# Patient Record
Sex: MALE | Race: WHITE | Employment: PART TIME | URBAN - METROPOLITAN AREA
[De-identification: names, ages, dates, MRNs, and addresses within clinical notes are randomized per-mention and may not be internally consistent; named-entity substitution may affect disease eponyms.]

---

## 2020-02-13 ENCOUNTER — APPOINTMENT (OUTPATIENT)
Dept: CT IMAGING | Age: 45
End: 2020-02-13
Payer: COMMERCIAL

## 2020-02-13 ENCOUNTER — APPOINTMENT (OUTPATIENT)
Dept: MRI IMAGING | Age: 45
End: 2020-02-13
Payer: COMMERCIAL

## 2020-02-13 ENCOUNTER — HOSPITAL ENCOUNTER (OUTPATIENT)
Age: 45
Setting detail: OBSERVATION
Discharge: HOME | End: 2020-02-14
Attending: EMERGENCY MEDICINE | Admitting: INTERNAL MEDICINE
Payer: COMMERCIAL

## 2020-02-13 PROBLEM — G45.9 TIA (TRANSIENT ISCHEMIC ATTACK): Status: ACTIVE | Noted: 2020-02-13

## 2020-02-13 LAB
A/G RATIO: 1.5 (ref 1.1–2.2)
ALBUMIN SERPL-MCNC: 4.6 G/DL (ref 3.4–5)
ALP BLD-CCNC: 103 U/L (ref 40–129)
ALT SERPL-CCNC: 38 U/L (ref 10–40)
ANION GAP SERPL CALCULATED.3IONS-SCNC: 10 MMOL/L (ref 3–16)
AST SERPL-CCNC: 28 U/L (ref 15–37)
BASOPHILS ABSOLUTE: 0.1 K/UL (ref 0–0.2)
BASOPHILS RELATIVE PERCENT: 0.9 %
BILIRUB SERPL-MCNC: 0.6 MG/DL (ref 0–1)
BILIRUBIN URINE: NEGATIVE
BLOOD, URINE: NEGATIVE
BUN BLDV-MCNC: 12 MG/DL (ref 7–20)
CALCIUM SERPL-MCNC: 9.2 MG/DL (ref 8.3–10.6)
CHLORIDE BLD-SCNC: 100 MMOL/L (ref 99–110)
CHOLESTEROL, TOTAL: 146 MG/DL (ref 0–199)
CLARITY: CLEAR
CO2: 27 MMOL/L (ref 21–32)
COLOR: YELLOW
CREAT SERPL-MCNC: 0.9 MG/DL (ref 0.9–1.3)
EKG ATRIAL RATE: 48 BPM
EKG DIAGNOSIS: NORMAL
EKG P AXIS: 40 DEGREES
EKG P-R INTERVAL: 152 MS
EKG Q-T INTERVAL: 446 MS
EKG QRS DURATION: 84 MS
EKG QTC CALCULATION (BAZETT): 398 MS
EKG R AXIS: 31 DEGREES
EKG T AXIS: 21 DEGREES
EKG VENTRICULAR RATE: 48 BPM
EOSINOPHILS ABSOLUTE: 0.1 K/UL (ref 0–0.6)
EOSINOPHILS RELATIVE PERCENT: 1.4 %
GFR AFRICAN AMERICAN: >60
GFR NON-AFRICAN AMERICAN: >60
GLOBULIN: 3 G/DL
GLUCOSE BLD-MCNC: 116 MG/DL (ref 70–99)
GLUCOSE URINE: NEGATIVE MG/DL
HCT VFR BLD CALC: 46.9 % (ref 40.5–52.5)
HDLC SERPL-MCNC: 42 MG/DL (ref 40–60)
HEMOGLOBIN: 16.3 G/DL (ref 13.5–17.5)
KETONES, URINE: NEGATIVE MG/DL
LDL CHOLESTEROL CALCULATED: 88 MG/DL
LEUKOCYTE ESTERASE, URINE: NEGATIVE
LYMPHOCYTES ABSOLUTE: 1.5 K/UL (ref 1–5.1)
LYMPHOCYTES RELATIVE PERCENT: 21.7 %
MCH RBC QN AUTO: 31.6 PG (ref 26–34)
MCHC RBC AUTO-ENTMCNC: 34.7 G/DL (ref 31–36)
MCV RBC AUTO: 91.1 FL (ref 80–100)
MICROSCOPIC EXAMINATION: NORMAL
MONOCYTES ABSOLUTE: 0.5 K/UL (ref 0–1.3)
MONOCYTES RELATIVE PERCENT: 7.8 %
NEUTROPHILS ABSOLUTE: 4.7 K/UL (ref 1.7–7.7)
NEUTROPHILS RELATIVE PERCENT: 68.2 %
NITRITE, URINE: NEGATIVE
PDW BLD-RTO: 13 % (ref 12.4–15.4)
PH UA: 7.5 (ref 5–8)
PLATELET # BLD: 250 K/UL (ref 135–450)
PMV BLD AUTO: 9.1 FL (ref 5–10.5)
POTASSIUM SERPL-SCNC: 4.6 MMOL/L (ref 3.5–5.1)
PROTEIN UA: NEGATIVE MG/DL
RBC # BLD: 5.15 M/UL (ref 4.2–5.9)
SODIUM BLD-SCNC: 137 MMOL/L (ref 136–145)
SPECIFIC GRAVITY UA: 1.01 (ref 1–1.03)
TOTAL PROTEIN: 7.6 G/DL (ref 6.4–8.2)
TRIGL SERPL-MCNC: 80 MG/DL (ref 0–150)
TROPONIN: <0.01 NG/ML
URINE TYPE: NORMAL
UROBILINOGEN, URINE: 0.2 E.U./DL
VLDLC SERPL CALC-MCNC: 16 MG/DL
WBC # BLD: 6.9 K/UL (ref 4–11)

## 2020-02-13 PROCEDURE — 80053 COMPREHEN METABOLIC PANEL: CPT

## 2020-02-13 PROCEDURE — G0378 HOSPITAL OBSERVATION PER HR: HCPCS

## 2020-02-13 PROCEDURE — 93005 ELECTROCARDIOGRAM TRACING: CPT | Performed by: EMERGENCY MEDICINE

## 2020-02-13 PROCEDURE — 6370000000 HC RX 637 (ALT 250 FOR IP): Performed by: INTERNAL MEDICINE

## 2020-02-13 PROCEDURE — 6360000004 HC RX CONTRAST MEDICATION: Performed by: EMERGENCY MEDICINE

## 2020-02-13 PROCEDURE — 97165 OT EVAL LOW COMPLEX 30 MIN: CPT

## 2020-02-13 PROCEDURE — 2580000003 HC RX 258: Performed by: INTERNAL MEDICINE

## 2020-02-13 PROCEDURE — 36415 COLL VENOUS BLD VENIPUNCTURE: CPT

## 2020-02-13 PROCEDURE — 81003 URINALYSIS AUTO W/O SCOPE: CPT

## 2020-02-13 PROCEDURE — 84484 ASSAY OF TROPONIN QUANT: CPT

## 2020-02-13 PROCEDURE — 99220 PR INITIAL OBSERVATION CARE/DAY 70 MINUTES: CPT | Performed by: PSYCHIATRY & NEUROLOGY

## 2020-02-13 PROCEDURE — 80061 LIPID PANEL: CPT

## 2020-02-13 PROCEDURE — 99285 EMERGENCY DEPT VISIT HI MDM: CPT

## 2020-02-13 PROCEDURE — 70450 CT HEAD/BRAIN W/O DYE: CPT

## 2020-02-13 PROCEDURE — 70496 CT ANGIOGRAPHY HEAD: CPT

## 2020-02-13 PROCEDURE — 85025 COMPLETE CBC W/AUTO DIFF WBC: CPT

## 2020-02-13 RX ORDER — IBUPROFEN 400 MG/1
400 TABLET ORAL ONCE
Status: COMPLETED | OUTPATIENT
Start: 2020-02-13 | End: 2020-02-13

## 2020-02-13 RX ORDER — ONDANSETRON 2 MG/ML
4 INJECTION INTRAMUSCULAR; INTRAVENOUS EVERY 6 HOURS PRN
Status: DISCONTINUED | OUTPATIENT
Start: 2020-02-13 | End: 2020-02-14 | Stop reason: HOSPADM

## 2020-02-13 RX ORDER — ASPIRIN 300 MG/1
300 SUPPOSITORY RECTAL DAILY
Status: DISCONTINUED | OUTPATIENT
Start: 2020-02-13 | End: 2020-02-14 | Stop reason: HOSPADM

## 2020-02-13 RX ORDER — SODIUM CHLORIDE 0.9 % (FLUSH) 0.9 %
10 SYRINGE (ML) INJECTION EVERY 12 HOURS SCHEDULED
Status: DISCONTINUED | OUTPATIENT
Start: 2020-02-13 | End: 2020-02-14 | Stop reason: HOSPADM

## 2020-02-13 RX ORDER — ATORVASTATIN CALCIUM 80 MG/1
80 TABLET, FILM COATED ORAL NIGHTLY
Status: DISCONTINUED | OUTPATIENT
Start: 2020-02-13 | End: 2020-02-14 | Stop reason: HOSPADM

## 2020-02-13 RX ORDER — LABETALOL HYDROCHLORIDE 5 MG/ML
10 INJECTION, SOLUTION INTRAVENOUS EVERY 10 MIN PRN
Status: DISCONTINUED | OUTPATIENT
Start: 2020-02-13 | End: 2020-02-14 | Stop reason: HOSPADM

## 2020-02-13 RX ORDER — SODIUM CHLORIDE 0.9 % (FLUSH) 0.9 %
10 SYRINGE (ML) INJECTION PRN
Status: DISCONTINUED | OUTPATIENT
Start: 2020-02-13 | End: 2020-02-14 | Stop reason: HOSPADM

## 2020-02-13 RX ORDER — POLYETHYLENE GLYCOL 3350 17 G/17G
17 POWDER, FOR SOLUTION ORAL DAILY PRN
Status: DISCONTINUED | OUTPATIENT
Start: 2020-02-13 | End: 2020-02-14 | Stop reason: HOSPADM

## 2020-02-13 RX ORDER — ASPIRIN 81 MG/1
81 TABLET ORAL DAILY
Status: DISCONTINUED | OUTPATIENT
Start: 2020-02-13 | End: 2020-02-14 | Stop reason: HOSPADM

## 2020-02-13 RX ADMIN — Medication 10 ML: at 20:33

## 2020-02-13 RX ADMIN — ASPIRIN 81 MG: 81 TABLET, COATED ORAL at 15:40

## 2020-02-13 RX ADMIN — IBUPROFEN 400 MG: 400 TABLET, FILM COATED ORAL at 18:53

## 2020-02-13 RX ADMIN — IOPAMIDOL 75 ML: 755 INJECTION, SOLUTION INTRAVENOUS at 09:39

## 2020-02-13 ASSESSMENT — ENCOUNTER SYMPTOMS
SINUS PRESSURE: 0
EYE PAIN: 0
ANAL BLEEDING: 0
BACK PAIN: 0
STRIDOR: 0
ABDOMINAL PAIN: 0
VOICE CHANGE: 0
DIARRHEA: 0
PHOTOPHOBIA: 0
CHEST TIGHTNESS: 0
BLOOD IN STOOL: 0
EYE REDNESS: 0
FACIAL SWELLING: 0
WHEEZING: 0
RHINORRHEA: 0
VOMITING: 0
COUGH: 0
NAUSEA: 0
SORE THROAT: 0
SHORTNESS OF BREATH: 0
EYE DISCHARGE: 0
CONSTIPATION: 0
EYE ITCHING: 0
ABDOMINAL DISTENTION: 0
TROUBLE SWALLOWING: 0

## 2020-02-13 ASSESSMENT — PAIN SCALES - GENERAL
PAINLEVEL_OUTOF10: 9
PAINLEVEL_OUTOF10: 0
PAINLEVEL_OUTOF10: 0

## 2020-02-13 NOTE — PROGRESS NOTES
only   Patient Goals   Patient goals :  To return home        Therapy Time   Individual Concurrent Group Co-treatment   Time In 1355         Time Out 1410         Minutes 15         Timed Code Treatment Minutes: 0 Minutes(15 minutes for eval )       Gill Garcia, OT

## 2020-02-13 NOTE — CONSULTS
enoxaparin (LOVENOX) injection 40 mg  40 mg Subcutaneous Daily Janet Braden MD        aspirin EC tablet 81 mg  81 mg Oral Daily Janet Braden MD        Or    aspirin suppository 300 mg  300 mg Rectal Daily Janet Braden MD        perflutren lipid microspheres (DEFINITY) injection 1.65 mg  1.5 mL Intravenous ONCE PRN Janet Braden MD        polyethylene glycol (GLYCOLAX) packet 17 g  17 g Oral Daily PRN Janet Braden MD        atorvastatin (LIPITOR) tablet 80 mg  80 mg Oral Nightly Janet Braden MD        labetalol (NORMODYNE;TRANDATE) injection 10 mg  10 mg Intravenous Q10 Min PRN Janet Braden MD           ROS : A 10-14 system review of constitutional, cardiovascular, respiratory, eyes, musculoskeletal, endocrine, GI, ENT, skin, hematological, genitourinary, psychiatric and neurologic systems was obtained and updated today and is unremarkable except as mentioned in my HPI      Exam:     Constitutional:   Vitals:    02/13/20 1019 02/13/20 1159 02/13/20 1214 02/13/20 1215   BP: 121/72 122/83 (!) 145/93    Pulse: 60 55 54    Resp: 14 16 18    Temp:   97.9 °F (36.6 °C)    TempSrc:   Oral    SpO2: 96% 100% 99%    Weight:    258 lb 8 oz (117.3 kg)   Height:    6' (1.829 m)       General appearance:  Normal development and appear in no acute distress. Eye: No icterus. Fundus: No blurring of optic disc. Neck: supple  Cardiovascular: No carotid bruit. No lower leg edema with good pulsation. Mental Status:   Oriented to person, place, problem, and time. Memory: Aware of recent and remote event. Good immediate recall. Intact remote memory  Normal attention span and concentration. Language: intact naming, repeating and fluency   Good fund of Knowledge. Aware of current events and vocabulary   Cranial Nerves:   II: Visual fields: Full to confrontation and nl [facility]. Pupils: equal, round, reactive to light  III,IV,VI: Extra Ocular Movements are intact.  No nystagmus  V: Facial sensation is intact to pin prick and light touch  VII: Facial strength and movements: intact and symmetric  VIII: Hearing: Intact to finger rub bilaterally  IX: Palate elevation is symmetric  XI: Shoulder shrug is intact  XII: Tongue movements are normal  Musculoskeletal: 5/5 in all 4 extremities. Tone: Normal tone. Reflexes: Bilateral biceps 2/4, triceps 2/4, brachial radialis 2/4, knee 2/4 and ankle 2/4. Planters: flexor bilaterally. Coordination: no pronator drift, no dysmetria with FNF in upper extremities. Normal REM. Sensation: normal to all modalities in both arms and legs. Gait/Posture: steady gait and normal posturing and station. Data:  LABS:   Lab Results   Component Value Date     02/13/2020    K 4.6 02/13/2020     02/13/2020    CO2 27 02/13/2020    BUN 12 02/13/2020    CREATININE 0.9 02/13/2020    GFRAA >60 02/13/2020    LABGLOM >60 02/13/2020    GLUCOSE 116 02/13/2020    CALCIUM 9.2 02/13/2020     Lab Results   Component Value Date    WBC 6.9 02/13/2020    RBC 5.15 02/13/2020    HGB 16.3 02/13/2020    HCT 46.9 02/13/2020    MCV 91.1 02/13/2020    RDW 13.0 02/13/2020     02/13/2020   No results found for: INR, PROTIME    Neuroimaging were independently reviewed by myself and discussed results with the patient  Reviewed notes from different physicians  Reviewed lab and blood testing    Impression:  Acute left-sided paresthesia. Possible TIA versus CVA. So far cryptogenic. Smoking      Recommendation:  MRI of the brain  Echo  Telemetry  DVT and GI prophylaxis  A1c  Lipid panel  Aspirin  Statin  Nicotine patch  Blood sugar and blood pressure monitor  PT and OT  Long discussion with the patient today regarding stroke prevention and risk of recurrence  Further recommendation to follow after MRI and echo. Thank you for referring such patient. If you have any questions regarding my consult note, please don't hesitate to call me.      Lieutenant Leni MD  [phone]    This

## 2020-02-13 NOTE — H&P
Hospital Medicine History & Physical      PCP: No primary care provider on file. Date of Admission: 2/13/2020    Date of Service: Pt seen/examined on 02/13/20 and placed in observation. Chief Complaint: Left-sided numbness      History Of Present Illness:      38 y.o. male who presented to [facility] with left-sided numbness that started about 24 hours ago and persisted off and on. Was at work when the symptoms first started. Profession is . Was working, but not performing any strenuous activity. Complaints initially were relieved by rubbing, later became persistent with some weakness of his left arm. No chest pain, no shortness of breath. Because of persistence of the complaints, patient came to the emergency room today. Stroke team was called. NIH stroke scale was 0.  tPA was not indicated. No other accompanying symptoms except for left temporal headache which stayed about 15 to 20 minutes and resolved after the first episode  Nothing else that makes the patient feel better or worse  Never had anything like this before prior to this 24-hour period. Comfortable at the time of this evaluation. States he feels the left side still weak especially on upper extremity. Past Medical History:      History reviewed. No pertinent past medical history. Past Surgical History:      History reviewed. No pertinent surgical history. Medications Prior to Admission:      Not on any medications at home    Allergies:  Patient has no known allergies. Social History:      The patient currently lives at home    TOBACCO:   reports that he has never smoked. He has never used smokeless tobacco.  ETOH:   reports previous alcohol use. E-Cigarettes Vaping or Juuling     Questions Responses    E-Cigarette Use     Start Date     Does device contain nicotine? Quit Date     E-Cigarette Type             Family History:      Reviewed in detail and negative for DM, CAD, Cancer, CVA. Positive as follows:    History reviewed. No pertinent family history. REVIEW OF SYSTEMS:   Pertinent positives as noted in the HPI. Left-sided numbness. All other systems reviewed and negative. PHYSICAL EXAM PERFORMED:    /72   Pulse 60   Temp 98 °F (36.7 °C)   Resp 14   Ht 6' (1.829 m)   Wt 252 lb (114.3 kg)   SpO2 96%   BMI 34.18 kg/m²     General appearance:  No apparent distress, appears stated age and cooperative. HEENT:  Normal cephalic, atraumatic without obvious deformity. Pupils equal, round, and reactive to light. Extra ocular muscles intact. Conjunctivae/corneas clear. Neck: Supple, with full range of motion. No jugular venous distention. Trachea midline. Respiratory:  Normal respiratory effort. Clear to auscultation, bilaterally without Rales/Wheezes/Rhonchi. Cardiovascular:  Regular rate and rhythm with normal S1/S2 without murmurs, rubs or gallops. Abdomen: Soft, non-tender, non-distended with normal bowel sounds. Musculoskeletal:  No clubbing, cyanosis or edema bilaterally. Full range of motion without deformity. Skin: Skin color, texture, turgor normal.  No rashes or lesions. Neurologic:  Neurovascularly intact without any focal sensory/motor deficits. Cranial nerves: II-XII intact, grossly non-focal.  Patient feels subjectively weak on the left upper extremity. I do not detect any difference in motor function between left and right. Psychiatric:  Alert and oriented, thought content appropriate, normal insight  Capillary Refill: Brisk,< 3 seconds   Peripheral Pulses: +2 palpable, equal bilaterally       Labs:     Recent Labs     02/13/20  0846   WBC 6.9   HGB 16.3   HCT 46.9        Recent Labs     02/13/20  0846      K 4.6      CO2 27   BUN 12   CREATININE 0.9   CALCIUM 9.2     Recent Labs     02/13/20  0846   AST 28   ALT 38   BILITOT 0.6   ALKPHOS 103     No results for input(s): INR in the last 72 hours.   Recent Labs     02/13/20  7237 TROPONINI <0.01       Urinalysis:    No results found for: Katherine Kuo, BACTERIA, RBCUA, BLOODU, [address]    Radiology:     CXR: I have reviewed the CXR with the following interpretation: Not done today  EKG:  I have reviewed the EKG with the following interpretation: Sinus bradycardia    CTA HEAD NECK W CONTRAST   Preliminary Result   Unremarkable CTA of the head and neck. CT HEAD WO CONTRAST   Preliminary Result   No acute intracranial abnormality. ASSESSMENT:    Active Hospital Problems    Diagnosis Date Noted    TIA (transient ischemic attack) [G45.9] 02/13/2020         PLAN:    Left hemiparesis  CT head, CT angiogram of head and neck are all unremarkable  No TPA indicated  Continue management per protocol, with aspirin and statin, while we wait for MRI of the brain and echocardiogram to evaluate the possibility of a TIA or a stroke. Neurology consulted    Sinus bradycardia  Transient. Might be related to patient's symptoms. Keep on telemetry. If bradycardia recurs, we will consult cardiology. Left-sided headache  This was also transient. If negative for CVA, could be atypical migraine. Neurology consulted. Discussed with the patient and wife, questions answered    DVT Prophylaxis: Lovenox  Diet: DIET GENERAL;  Code Status: Full Code    PT/OT Eval Status: Requested    Dispo -observation stay pending work-up       Montse Devine MD    Thank you  for the opportunity to be involved in this patient's care. If you have any questions or concerns please feel free to contact me at [phone].

## 2020-02-13 NOTE — PROGRESS NOTES
Patient arrived to the floor in calm and stable condition. Patient AOx4 VSS and assessment completed. No skin issues. SB on tele monitor (baseline). SpO2 wnl on RA. Dietary called for food order. Patient oriented to the room and plan of care. Call light and bedside table within reach, bed alarm off as patient is independent, bed in lowest position and locked with 2/4 rails raised. RN will continue to monitor.

## 2020-02-13 NOTE — ED PROVIDER NOTES
which \"comes and goes\"). Negative for back pain, joint swelling, neck pain and neck stiffness. Skin: Negative for rash and wound. Neurological: Positive for weakness (LUE), numbness (left side of his face, not including his forehead) and headaches (frontal and bitemporal). Negative for dizziness, syncope, facial asymmetry and speech difficulty. Hematological: Does not bruise/bleed easily. Psychiatric/Behavioral: Negative for agitation, confusion, hallucinations, self-injury, sleep disturbance and suicidal ideas. The patient is not nervous/anxious. All other systems reviewed and are negative. Physical Exam  Vitals signs and nursing note reviewed. Constitutional:       General: He is not in acute distress. Appearance: He is well-developed. HENT:      Head: Normocephalic and atraumatic. Right Ear: External ear normal.      Left Ear: External ear normal.      Nose: Nose normal.      Mouth/Throat:      Pharynx: No oropharyngeal exudate. Eyes:      General: No scleral icterus. Right eye: No discharge. Left eye: No discharge. Conjunctiva/sclera: Conjunctivae normal.      Pupils: Pupils are equal, round, and reactive to light. Neck:      Musculoskeletal: Normal range of motion and neck supple. Vascular: No JVD. Trachea: No tracheal deviation. Cardiovascular:      Rate and Rhythm: Normal rate and regular rhythm. Heart sounds: Normal heart sounds. No murmur. No friction rub. No gallop. Pulmonary:      Effort: Pulmonary effort is normal. No respiratory distress. Breath sounds: Normal breath sounds. No wheezing or rales. Abdominal:      General: Bowel sounds are normal. There is no distension. Palpations: Abdomen is soft. There is no mass. Tenderness: There is no abdominal tenderness. There is no guarding or rebound. Musculoskeletal: Normal range of motion. General: No tenderness.    Lymphadenopathy:      Cervical: No cervical adenopathy. Skin:     General: Skin is warm and dry. Coloration: Skin is not pale. Findings: No erythema or rash. Neurological:      Mental Status: He is alert and oriented to person, place, and time. GCS: GCS eye subscore is 4. GCS verbal subscore is 5. GCS motor subscore is 6. Cranial Nerves: Cranial nerves are intact. No cranial nerve deficit. Sensory: Sensation is intact. Motor: Motor function is intact. No abnormal muscle tone. Coordination: Coordination is intact. Coordination normal.      Gait: Gait is intact. Deep Tendon Reflexes: Reflexes are normal and symmetric. Reflexes normal.      Reflex Scores:       Bicep reflexes are 2+ on the right side and 2+ on the left side. Patellar reflexes are 2+ on the right side and 2+ on the left side. Comments: Coordination, gait, speech, balance and cognition are intact. There is no nuchal rigidity or evidence of meningismus. Negative Kernig's and Brudzinski's signs. All sensory and motor components of the brachial/lumbosacral plexus tested are symmetric and intact. No focal deficits appreciated. NIH stroke score 0   Psychiatric:         Behavior: Behavior normal.         Thought Content:  Thought content normal.         Judgment: Judgment normal.          Procedures     MDM   Results for orders placed or performed during the hospital encounter of 02/13/20   CBC Auto Differential   Result Value Ref Range    WBC 6.9 4.0 - 11.0 K/uL    RBC 5.15 4.20 - 5.90 M/uL    Hemoglobin 16.3 13.5 - 17.5 g/dL    Hematocrit 46.9 40.5 - 52.5 %    MCV 91.1 80.0 - 100.0 fL    MCH 31.6 26.0 - 34.0 pg    MCHC 34.7 31.0 - 36.0 g/dL    RDW 13.0 12.4 - 15.4 %    Platelets 508 487 - 167 K/uL    MPV 9.1 5.0 - 10.5 fL    Neutrophils % 68.2 %    Lymphocytes % 21.7 %    Monocytes % 7.8 %    Eosinophils % 1.4 %    Basophils % 0.9 %    Neutrophils Absolute 4.7 1.7 - 7.7 K/uL    Lymphocytes Absolute 1.5 1.0 - 5.1 K/uL    Monocytes Absolute 0.5 0.0 - 1.3 K/uL    Eosinophils Absolute 0.1 0.0 - 0.6 K/uL    Basophils Absolute 0.1 0.0 - 0.2 K/uL   Comprehensive Metabolic Panel   Result Value Ref Range    Sodium 137 136 - 145 mmol/L    Potassium 4.6 3.5 - 5.1 mmol/L    Chloride 100 99 - 110 mmol/L    CO2 27 21 - 32 mmol/L    Anion Gap 10 3 - 16    Glucose 116 (H) 70 - 99 mg/dL    BUN 12 7 - 20 mg/dL    CREATININE 0.9 0.9 - 1.3 mg/dL    GFR Non-African American >60 >60    GFR African American >60 >60    Calcium 9.2 8.3 - 10.6 mg/dL    Total Protein 7.6 6.4 - 8.2 g/dL    Alb 4.6 3.4 - 5.0 g/dL    Albumin/Globulin Ratio 1.5 1.1 - 2.2    Total Bilirubin 0.6 0.0 - 1.0 mg/dL    Alkaline Phosphatase 103 40 - 129 U/L    ALT 38 10 - 40 U/L    AST 28 15 - 37 U/L    Globulin 3.0 g/dL   Troponin   Result Value Ref Range    Troponin <0.01 <0.01 ng/mL   EKG 12 Lead   Result Value Ref Range    Ventricular Rate 48 BPM    Atrial Rate 48 BPM    P-R Interval 152 ms    QRS Duration 84 ms    Q-T Interval 446 ms    QTc Calculation (Bazett) 398 ms    P Axis 40 degrees    R Axis 31 degrees    T Axis 21 degrees    Diagnosis       Marked sinus bradycardiaAbnormal ECGNo previous ECGs available       I spoke with Dr. Eve Gonzales, hospitalist. We thoroughly discussed the history, physical exam, laboratory and imaging studies, as well as, emergency department course. Based upon that discussion, we've decided to admit Leora Lacy for further observation and evaluation of Dana Landa's CVA-like symptoms. As I have deemed necessary from their history, physical and studies, I have considered and evaluated Leora Lacy for the following diagnoses:  DIABETES, INTRACRANIAL HEMORRHAGE, MENINGITIS, SUBARACHNOID HEMORRHAGE, SUBDURAL HEMATOMA, & STROKE. FINAL IMPRESSION  1. TIA (transient ischemic attack)    2. Paresthesia        Vitals:  Blood pressure 121/72, pulse 60, temperature 98 °F (36.7 °C), resp. rate 14, height 6' (1.829 m), weight 252 lb (114.3 kg), SpO2 96 %.   Radiology  Ct Head Wo

## 2020-02-13 NOTE — PROGRESS NOTES
Speech Language Pathology  SLP Eval and Treat    SLP acknowledged order for SLP Eval and Treat, reviewed pt's chart, spoke with RN. Pt passed swallow screen with RN per chart. Pt denied dysphagia and no hx of dysphagia per chart review. Oral-motor exam grossly unremarkable. Pt denied speech/lang/cog deficits, able to participate in conversation with SLP without difficulty. Formal evaluation not indicated at this time, please re-refer ST if changes occur. No charges.     Thank you,  DEMETRIS ArshadS. [id number] [facility]  Speech-language pathologist  [phone]

## 2020-02-14 VITALS
TEMPERATURE: 98.2 F | RESPIRATION RATE: 18 BRPM | WEIGHT: 258.5 LBS | HEART RATE: 60 BPM | DIASTOLIC BLOOD PRESSURE: 82 MMHG | SYSTOLIC BLOOD PRESSURE: 125 MMHG | OXYGEN SATURATION: 99 % | HEIGHT: 72 IN | BODY MASS INDEX: 35.01 KG/M2

## 2020-02-14 LAB
A/G RATIO: 1.5 (ref 1.1–2.2)
ALBUMIN SERPL-MCNC: 4 G/DL (ref 3.4–5)
ALP BLD-CCNC: 90 U/L (ref 40–129)
ALT SERPL-CCNC: 34 U/L (ref 10–40)
ANION GAP SERPL CALCULATED.3IONS-SCNC: 6 MMOL/L (ref 3–16)
AST SERPL-CCNC: 23 U/L (ref 15–37)
BILIRUB SERPL-MCNC: 0.5 MG/DL (ref 0–1)
BUN BLDV-MCNC: 13 MG/DL (ref 7–20)
CALCIUM SERPL-MCNC: 8.9 MG/DL (ref 8.3–10.6)
CHLORIDE BLD-SCNC: 102 MMOL/L (ref 99–110)
CO2: 27 MMOL/L (ref 21–32)
CREAT SERPL-MCNC: 0.9 MG/DL (ref 0.9–1.3)
ESTIMATED AVERAGE GLUCOSE: 93.9 MG/DL
GFR AFRICAN AMERICAN: >60
GFR NON-AFRICAN AMERICAN: >60
GLOBULIN: 2.7 G/DL
GLUCOSE BLD-MCNC: 96 MG/DL (ref 70–99)
HBA1C MFR BLD: 4.9 %
HCT VFR BLD CALC: 43.4 % (ref 40.5–52.5)
HEMOGLOBIN: 15 G/DL (ref 13.5–17.5)
LV EF: 58 %
LVEF MODALITY: NORMAL
MCH RBC QN AUTO: 31.5 PG (ref 26–34)
MCHC RBC AUTO-ENTMCNC: 34.5 G/DL (ref 31–36)
MCV RBC AUTO: 91.2 FL (ref 80–100)
PDW BLD-RTO: 13 % (ref 12.4–15.4)
PLATELET # BLD: 224 K/UL (ref 135–450)
PMV BLD AUTO: 9.2 FL (ref 5–10.5)
POTASSIUM REFLEX MAGNESIUM: 4.5 MMOL/L (ref 3.5–5.1)
RBC # BLD: 4.76 M/UL (ref 4.2–5.9)
SODIUM BLD-SCNC: 135 MMOL/L (ref 136–145)
TOTAL PROTEIN: 6.7 G/DL (ref 6.4–8.2)
WBC # BLD: 6.1 K/UL (ref 4–11)

## 2020-02-14 PROCEDURE — 2580000003 HC RX 258: Performed by: INTERNAL MEDICINE

## 2020-02-14 PROCEDURE — 6370000000 HC RX 637 (ALT 250 FOR IP): Performed by: INTERNAL MEDICINE

## 2020-02-14 PROCEDURE — 36415 COLL VENOUS BLD VENIPUNCTURE: CPT

## 2020-02-14 PROCEDURE — 80053 COMPREHEN METABOLIC PANEL: CPT

## 2020-02-14 PROCEDURE — 99225 PR SBSQ OBSERVATION CARE/DAY 25 MINUTES: CPT | Performed by: PSYCHIATRY & NEUROLOGY

## 2020-02-14 PROCEDURE — 85027 COMPLETE CBC AUTOMATED: CPT

## 2020-02-14 PROCEDURE — 83036 HEMOGLOBIN GLYCOSYLATED A1C: CPT

## 2020-02-14 PROCEDURE — G0378 HOSPITAL OBSERVATION PER HR: HCPCS

## 2020-02-14 PROCEDURE — 93306 TTE W/DOPPLER COMPLETE: CPT

## 2020-02-14 RX ORDER — ASPIRIN 81 MG/1
81 TABLET ORAL DAILY
Qty: 30 TABLET | Refills: 3 | Status: ON HOLD | OUTPATIENT
Start: 2020-02-15 | End: 2022-10-10 | Stop reason: HOSPADM

## 2020-02-14 RX ORDER — ATORVASTATIN CALCIUM 40 MG/1
40 TABLET, FILM COATED ORAL NIGHTLY
Qty: 30 TABLET | Refills: 1 | Status: SHIPPED | OUTPATIENT
Start: 2020-02-14

## 2020-02-14 RX ADMIN — ASPIRIN 81 MG: 81 TABLET, COATED ORAL at 10:39

## 2020-02-14 RX ADMIN — Medication 10 ML: at 10:40

## 2020-02-14 ASSESSMENT — PAIN SCALES - GENERAL
PAINLEVEL_OUTOF10: 0
PAINLEVEL_OUTOF10: 0

## 2020-02-14 NOTE — DISCHARGE SUMMARY
Hospital Medicine Discharge Summary    Patient ID: Bam Nye      Patient's PCP: No primary care provider on file. Admit Date: 2/13/2020     Discharge Date:   02/14/20       Admitting Physician: Gregory Brown MD     Discharge Physician: Cassy Martinez MD     Discharge Diagnoses: Active Hospital Problems    Diagnosis    TIA (transient ischemic attack) [G45.9]    Paresthesia [R20.2]    Smoking [F17.200]       The patient was seen and examined on day of discharge and this discharge summary is in conjunction with any daily progress note from day of discharge. Hospital Course:     Patient was admitted with left-sided weakness and headache. Did not have chest pain. CT angiogram of head and neck, echocardiogram, MRI of the brain without contrast were all performed and found negative. Did not have any detectable neurological abnormalities on the physical exam  Non-smoker  LDL found 88. If he assume this episode is a TIA, 88 of LDL is high for secondary prevention and will require statin. Fully asymptomatic on the day of discharge. Will be discharged home on aspirin and statin. Physical Exam Performed:     /82   Pulse 60   Temp 98.2 °F (36.8 °C) (Oral)   Resp 18   Ht 6' (1.829 m)   Wt 258 lb 8 oz (117.3 kg)   SpO2 99%   BMI 35.06 kg/m²       General appearance:  No apparent distress, appears stated age and cooperative. HEENT:  Normal cephalic, atraumatic without obvious deformity. Pupils equal, round, and reactive to light. Extra ocular muscles intact. Conjunctivae/corneas clear. Neck: Supple, with full range of motion. No jugular venous distention. Trachea midline. Respiratory:  Normal respiratory effort. Clear to auscultation, bilaterally without Rales/Wheezes/Rhonchi. Cardiovascular:  Regular rate and rhythm with normal S1/S2 without murmurs, rubs or gallops. Abdomen: Soft, non-tender, non-distended with normal bowel sounds.   Musculoskeletal:  No clubbing, MD   2/14/2020      Thank you for the opportunity to be involved in this patient's care. If you have any questions or concerns please feel free to contact me at [phone].

## 2020-02-14 NOTE — PROGRESS NOTES
[facility]  Neurology Follow-up  [facility] Neurology    Date of Service: 2/14/2020    Subjective:   CC: Follow up today regarding: Acute left-sided numbness and possible stroke. Events noted. Chart and lab reviewed. The patient feels back to his baseline. He denies any more numbness or tingling or weakness. No headache, chest pain, dysphagia or dysarthria. No new symptoms. Further work-up with MRI brain showed no acute stroke. Echo showed normal EF. Other review of system was unremarkable. ROS : A 10-12 system review obtained and updated today and is unremarkable except as mentioned  in my interval history. family history is not on file. History reviewed. No pertinent past medical history. No current facility-administered medications for this encounter. Current Outpatient Medications   Medication Sig Dispense Refill    [START ON 2/15/2020] aspirin 81 MG EC tablet Take 1 tablet by mouth daily 30 tablet 3    atorvastatin (LIPITOR) 40 MG tablet Take 1 tablet by mouth nightly 30 tablet 1     No Known Allergies   reports that he has never smoked. He has never used smokeless tobacco. He reports previous alcohol use. He reports previous drug use. Objective:  Exam:   Constitutional:   Vitals:    02/14/20 0030 02/14/20 0515 02/14/20 0830 02/14/20 1136   BP: 119/75 117/73 120/71 125/82   Pulse: 57 53 62 60   Resp: 16 16 20 18   Temp: 97.7 °F (36.5 °C) 97.9 °F (36.6 °C) 98.1 °F (36.7 °C) 98.2 °F (36.8 °C)   TempSrc: Oral Oral Oral Oral   SpO2: 98% 98% 99% 99%   Weight:       Height:         General appearance:  Normal development and appear in no acute distress. Eye: No icterus. Neck: supple  Cardiovascular:  No lower leg edema with good pulsation. Mental Status:   Oriented to person, place, problem, and time. Memory: Aware of recent and remote event. Good immediate recall. Intact remote memory  Normal attention span and concentration.   Language: intact naming, repeating and fluency   Good fund of Knowledge. Cranial Nerves:   II: Visual fields: Full. Pupils: equal, round, reactive to light  III,IV,VI: Extra Ocular Movements are intact. No nystagmus  V: Facial sensation is intact  VII: Facial strength and movements: intact and symmetric  IX: Palate elevation is symmetric  XI: Shoulder shrug is intact  XII: Tongue movements are normal  Musculoskeletal: 5/5 in all 4 extremities. Tone: Normal tone. Reflexes: Bilateral biceps 2/4, triceps 2/4, brachial radialis 2/4, knee 2/4 and ankle 2/4. Planters: flexor bilaterally. Coordination: no pronator drift, no dysmetria with FNF. Normal REM. Sensation: normal to all modalities in both arms and legs. Gait/Posture: steady gait        Data:  LABS:   Lab Results   Component Value Date     02/14/2020    K 4.5 02/14/2020     02/14/2020    CO2 27 02/14/2020    BUN 13 02/14/2020    CREATININE 0.9 02/14/2020    GFRAA >60 02/14/2020    LABGLOM >60 02/14/2020    GLUCOSE 96 02/14/2020    CALCIUM 8.9 02/14/2020     Lab Results   Component Value Date    WBC 6.1 02/14/2020    RBC 4.76 02/14/2020    HGB 15.0 02/14/2020    HCT 43.4 02/14/2020    MCV 91.2 02/14/2020    RDW 13.0 02/14/2020     02/14/2020   No results found for: INR, PROTIME    Neuroimaging was independently reviewed by me and discussed results with the patient. I reviewed blood testing and other test results and discussed results with the patient. Impression:  Acute left-sided paresthesia. Possible TIA. Could be cryptogenic. Smoking      Recommendation  Test results were discussed with the patient today  Long discussion regarding stroke prevention and risk of recurrence  Aspirin  Statin  Nicotine patch  Blood pressure and blood sugar monitor at home  Follow-up with his new primary care physician and consider event monitor for 1 month to rule out paroxysmal A. fib.   He will arrange this test with his new primary care physician  He voiced understanding  No further recommendation  Can be discharged from neurology. Laine Quintana MD   [phone]      This dictation was generated by voice recognition computer software. Although all attempts are made to edit the dictation for accuracy, there may be errors in the transcription that are not intended.

## 2020-02-14 NOTE — CARE COORDINATION
Per conversation with primary nurse at 1100 huddle, pt will not have needs at discharge. IPTA. Please consult CM team if needs arise.      Hernando Banks RN CM

## 2022-10-03 ENCOUNTER — HOSPITAL ENCOUNTER (EMERGENCY)
Age: 47
Discharge: ANOTHER HOSPITAL | End: 2022-10-03
Attending: EMERGENCY MEDICINE
Payer: COMMERCIAL

## 2022-10-03 ENCOUNTER — APPOINTMENT (OUTPATIENT)
Dept: MRI IMAGING | Age: 47
DRG: 066 | End: 2022-10-03
Attending: INTERNAL MEDICINE
Payer: COMMERCIAL

## 2022-10-03 ENCOUNTER — APPOINTMENT (OUTPATIENT)
Dept: CT IMAGING | Age: 47
End: 2022-10-03
Payer: COMMERCIAL

## 2022-10-03 ENCOUNTER — HOSPITAL ENCOUNTER (INPATIENT)
Age: 47
LOS: 7 days | Discharge: HOME | DRG: 066 | End: 2022-10-10
Attending: INTERNAL MEDICINE | Admitting: HOSPITALIST
Payer: COMMERCIAL

## 2022-10-03 VITALS
WEIGHT: 260 LBS | BODY MASS INDEX: 35.26 KG/M2 | OXYGEN SATURATION: 100 % | TEMPERATURE: 98 F | RESPIRATION RATE: 12 BRPM | DIASTOLIC BLOOD PRESSURE: 78 MMHG | HEART RATE: 60 BPM | SYSTOLIC BLOOD PRESSURE: 133 MMHG

## 2022-10-03 DIAGNOSIS — T39.015A PLATELET DYSFUNCTION DUE TO ASPIRIN (HCC): ICD-10-CM

## 2022-10-03 DIAGNOSIS — D69.1 PLATELET DYSFUNCTION DUE TO ASPIRIN (HCC): ICD-10-CM

## 2022-10-03 DIAGNOSIS — S06.5XAA SUBDURAL HEMATOMA: Primary | ICD-10-CM

## 2022-10-03 DIAGNOSIS — I60.9 SUBARACHNOID HEMORRHAGE (HCC): Primary | ICD-10-CM

## 2022-10-03 DIAGNOSIS — I62.00 SUBDURAL HEMORRHAGE (HCC): ICD-10-CM

## 2022-10-03 LAB
A/G RATIO: 1.8 (ref 1.1–2.2)
ALBUMIN SERPL-MCNC: 4.9 G/DL (ref 3.4–5)
ALP BLD-CCNC: 113 U/L (ref 40–129)
ALT SERPL-CCNC: 47 U/L (ref 10–40)
ANION GAP SERPL CALCULATED.3IONS-SCNC: 9 MMOL/L (ref 3–16)
APTT: 27.8 SEC (ref 23–34.3)
AST SERPL-CCNC: 27 U/L (ref 15–37)
BASOPHILS ABSOLUTE: 0.1 K/UL (ref 0–0.2)
BASOPHILS RELATIVE PERCENT: 0.7 %
BILIRUB SERPL-MCNC: 0.7 MG/DL (ref 0–1)
BUN BLDV-MCNC: 16 MG/DL (ref 7–20)
CALCIUM SERPL-MCNC: 9.5 MG/DL (ref 8.3–10.6)
CHLORIDE BLD-SCNC: 101 MMOL/L (ref 99–110)
CO2: 26 MMOL/L (ref 21–32)
CREAT SERPL-MCNC: 0.9 MG/DL (ref 0.9–1.3)
EOSINOPHILS ABSOLUTE: 0.1 K/UL (ref 0–0.6)
EOSINOPHILS RELATIVE PERCENT: 1.4 %
GFR AFRICAN AMERICAN: >60
GFR NON-AFRICAN AMERICAN: >60
GLUCOSE BLD-MCNC: 112 MG/DL (ref 70–99)
HCT VFR BLD CALC: 46 % (ref 40.5–52.5)
HEMOGLOBIN: 15.8 G/DL (ref 13.5–17.5)
INR BLD: 1.06 (ref 0.87–1.14)
LYMPHOCYTES ABSOLUTE: 1.6 K/UL (ref 1–5.1)
LYMPHOCYTES RELATIVE PERCENT: 16.8 %
MCH RBC QN AUTO: 31.4 PG (ref 26–34)
MCHC RBC AUTO-ENTMCNC: 34.3 G/DL (ref 31–36)
MCV RBC AUTO: 91.5 FL (ref 80–100)
MONOCYTES ABSOLUTE: 0.6 K/UL (ref 0–1.3)
MONOCYTES RELATIVE PERCENT: 6.2 %
NEUTROPHILS ABSOLUTE: 7.1 K/UL (ref 1.7–7.7)
NEUTROPHILS RELATIVE PERCENT: 74.9 %
PDW BLD-RTO: 12.8 % (ref 12.4–15.4)
PLATELET # BLD: 226 K/UL (ref 135–450)
PMV BLD AUTO: 8.7 FL (ref 5–10.5)
POTASSIUM REFLEX MAGNESIUM: 4.5 MMOL/L (ref 3.5–5.1)
PROTHROMBIN TIME: 13.6 SEC (ref 11.7–14.5)
RBC # BLD: 5.02 M/UL (ref 4.2–5.9)
SODIUM BLD-SCNC: 136 MMOL/L (ref 136–145)
TOTAL PROTEIN: 7.7 G/DL (ref 6.4–8.2)
WBC # BLD: 9.6 K/UL (ref 4–11)

## 2022-10-03 PROCEDURE — 85730 THROMBOPLASTIN TIME PARTIAL: CPT

## 2022-10-03 PROCEDURE — 70450 CT HEAD/BRAIN W/O DYE: CPT

## 2022-10-03 PROCEDURE — 6360000002 HC RX W HCPCS: Performed by: STUDENT IN AN ORGANIZED HEALTH CARE EDUCATION/TRAINING PROGRAM

## 2022-10-03 PROCEDURE — 6360000004 HC RX CONTRAST MEDICATION: Performed by: STUDENT IN AN ORGANIZED HEALTH CARE EDUCATION/TRAINING PROGRAM

## 2022-10-03 PROCEDURE — 96376 TX/PRO/DX INJ SAME DRUG ADON: CPT

## 2022-10-03 PROCEDURE — 85025 COMPLETE CBC W/AUTO DIFF WBC: CPT

## 2022-10-03 PROCEDURE — 36415 COLL VENOUS BLD VENIPUNCTURE: CPT

## 2022-10-03 PROCEDURE — 96375 TX/PRO/DX INJ NEW DRUG ADDON: CPT

## 2022-10-03 PROCEDURE — 85610 PROTHROMBIN TIME: CPT

## 2022-10-03 PROCEDURE — 6360000002 HC RX W HCPCS: Performed by: EMERGENCY MEDICINE

## 2022-10-03 PROCEDURE — 70544 MR ANGIOGRAPHY HEAD W/O DYE: CPT

## 2022-10-03 PROCEDURE — 99285 EMERGENCY DEPT VISIT HI MDM: CPT

## 2022-10-03 PROCEDURE — 70498 CT ANGIOGRAPHY NECK: CPT

## 2022-10-03 PROCEDURE — 2580000003 HC RX 258: Performed by: EMERGENCY MEDICINE

## 2022-10-03 PROCEDURE — 70553 MRI BRAIN STEM W/O & W/DYE: CPT

## 2022-10-03 PROCEDURE — 6370000000 HC RX 637 (ALT 250 FOR IP)

## 2022-10-03 PROCEDURE — 6370000000 HC RX 637 (ALT 250 FOR IP): Performed by: STUDENT IN AN ORGANIZED HEALTH CARE EDUCATION/TRAINING PROGRAM

## 2022-10-03 PROCEDURE — 6360000004 HC RX CONTRAST MEDICATION: Performed by: EMERGENCY MEDICINE

## 2022-10-03 PROCEDURE — 2580000003 HC RX 258: Performed by: STUDENT IN AN ORGANIZED HEALTH CARE EDUCATION/TRAINING PROGRAM

## 2022-10-03 PROCEDURE — 2000000000 HC ICU R&B

## 2022-10-03 PROCEDURE — A9576 INJ PROHANCE MULTIPACK: HCPCS | Performed by: STUDENT IN AN ORGANIZED HEALTH CARE EDUCATION/TRAINING PROGRAM

## 2022-10-03 PROCEDURE — 96365 THER/PROPH/DIAG IV INF INIT: CPT

## 2022-10-03 PROCEDURE — 70546 MR ANGIOGRAPH HEAD W/O&W/DYE: CPT

## 2022-10-03 PROCEDURE — 80053 COMPREHEN METABOLIC PANEL: CPT

## 2022-10-03 RX ORDER — SODIUM CHLORIDE 9 MG/ML
INJECTION, SOLUTION INTRAVENOUS CONTINUOUS
Status: ACTIVE | OUTPATIENT
Start: 2022-10-03 | End: 2022-10-04

## 2022-10-03 RX ORDER — PROMETHAZINE HYDROCHLORIDE 25 MG/1
12.5 TABLET ORAL EVERY 6 HOURS PRN
Status: DISCONTINUED | OUTPATIENT
Start: 2022-10-03 | End: 2022-10-05

## 2022-10-03 RX ORDER — ONDANSETRON 2 MG/ML
4 INJECTION INTRAMUSCULAR; INTRAVENOUS EVERY 6 HOURS PRN
Status: DISCONTINUED | OUTPATIENT
Start: 2022-10-03 | End: 2022-10-10 | Stop reason: HOSPADM

## 2022-10-03 RX ORDER — ONDANSETRON 2 MG/ML
4 INJECTION INTRAMUSCULAR; INTRAVENOUS ONCE
Status: COMPLETED | OUTPATIENT
Start: 2022-10-03 | End: 2022-10-03

## 2022-10-03 RX ORDER — METOCLOPRAMIDE HYDROCHLORIDE 5 MG/ML
10 INJECTION INTRAMUSCULAR; INTRAVENOUS ONCE
Status: COMPLETED | OUTPATIENT
Start: 2022-10-03 | End: 2022-10-03

## 2022-10-03 RX ORDER — MECLIZINE HCL 12.5 MG/1
12.5 TABLET ORAL 3 TIMES DAILY PRN
Status: DISCONTINUED | OUTPATIENT
Start: 2022-10-03 | End: 2022-10-10 | Stop reason: HOSPADM

## 2022-10-03 RX ORDER — DIPHENHYDRAMINE HYDROCHLORIDE 50 MG/ML
25 INJECTION INTRAMUSCULAR; INTRAVENOUS ONCE
Status: COMPLETED | OUTPATIENT
Start: 2022-10-03 | End: 2022-10-03

## 2022-10-03 RX ORDER — ONDANSETRON 4 MG/1
4 TABLET, ORALLY DISINTEGRATING ORAL EVERY 8 HOURS PRN
Status: DISCONTINUED | OUTPATIENT
Start: 2022-10-03 | End: 2022-10-10 | Stop reason: HOSPADM

## 2022-10-03 RX ORDER — FENTANYL CITRATE 50 UG/ML
100 INJECTION, SOLUTION INTRAMUSCULAR; INTRAVENOUS
Status: COMPLETED | OUTPATIENT
Start: 2022-10-03 | End: 2022-10-03

## 2022-10-03 RX ORDER — ATORVASTATIN CALCIUM 40 MG/1
40 TABLET, FILM COATED ORAL NIGHTLY
Status: DISCONTINUED | OUTPATIENT
Start: 2022-10-03 | End: 2022-10-10 | Stop reason: HOSPADM

## 2022-10-03 RX ORDER — ACETAMINOPHEN 325 MG/1
650 TABLET ORAL EVERY 4 HOURS PRN
Status: DISCONTINUED | OUTPATIENT
Start: 2022-10-03 | End: 2022-10-10 | Stop reason: HOSPADM

## 2022-10-03 RX ADMIN — FENTANYL CITRATE 100 MCG: 0.05 INJECTION, SOLUTION INTRAMUSCULAR; INTRAVENOUS at 12:59

## 2022-10-03 RX ADMIN — FENTANYL CITRATE 100 MCG: 0.05 INJECTION, SOLUTION INTRAMUSCULAR; INTRAVENOUS at 11:27

## 2022-10-03 RX ADMIN — PROMETHAZINE HYDROCHLORIDE 12.5 MG: 25 TABLET ORAL at 21:47

## 2022-10-03 RX ADMIN — HYDROMORPHONE HYDROCHLORIDE 1 MG: 1 INJECTION, SOLUTION INTRAMUSCULAR; INTRAVENOUS; SUBCUTANEOUS at 18:49

## 2022-10-03 RX ADMIN — GADOTERIDOL 20 ML: 279.3 INJECTION, SOLUTION INTRAVENOUS at 23:56

## 2022-10-03 RX ADMIN — FENTANYL CITRATE 100 MCG: 0.05 INJECTION, SOLUTION INTRAMUSCULAR; INTRAVENOUS at 09:07

## 2022-10-03 RX ADMIN — DESMOPRESSIN ACETATE 40 MCG: 4 SOLUTION INTRAVENOUS at 10:44

## 2022-10-03 RX ADMIN — ACETAMINOPHEN 650 MG: 325 TABLET, FILM COATED ORAL at 23:01

## 2022-10-03 RX ADMIN — SODIUM CHLORIDE 1000 MG: 9 INJECTION, SOLUTION INTRAVENOUS at 09:25

## 2022-10-03 RX ADMIN — SODIUM CHLORIDE: 9 INJECTION, SOLUTION INTRAVENOUS at 21:05

## 2022-10-03 RX ADMIN — METOCLOPRAMIDE 10 MG: 5 INJECTION, SOLUTION INTRAMUSCULAR; INTRAVENOUS at 07:34

## 2022-10-03 RX ADMIN — IOPAMIDOL 75 ML: 755 INJECTION, SOLUTION INTRAVENOUS at 08:28

## 2022-10-03 RX ADMIN — HYDROMORPHONE HYDROCHLORIDE 1 MG: 1 INJECTION, SOLUTION INTRAMUSCULAR; INTRAVENOUS; SUBCUTANEOUS at 13:56

## 2022-10-03 RX ADMIN — DIPHENHYDRAMINE HYDROCHLORIDE 25 MG: 50 INJECTION, SOLUTION INTRAMUSCULAR; INTRAVENOUS at 07:34

## 2022-10-03 RX ADMIN — HYDROMORPHONE HYDROCHLORIDE 1 MG: 1 INJECTION, SOLUTION INTRAMUSCULAR; INTRAVENOUS; SUBCUTANEOUS at 16:25

## 2022-10-03 RX ADMIN — ONDANSETRON 4 MG: 2 INJECTION INTRAMUSCULAR; INTRAVENOUS at 18:48

## 2022-10-03 RX ADMIN — ONDANSETRON 4 MG: 2 INJECTION INTRAMUSCULAR; INTRAVENOUS at 21:20

## 2022-10-03 ASSESSMENT — PAIN DESCRIPTION - LOCATION
LOCATION: HEAD

## 2022-10-03 ASSESSMENT — PAIN - FUNCTIONAL ASSESSMENT
PAIN_FUNCTIONAL_ASSESSMENT: 0-10
PAIN_FUNCTIONAL_ASSESSMENT: 0-10

## 2022-10-03 ASSESSMENT — ENCOUNTER SYMPTOMS
CHEST TIGHTNESS: 0
ABDOMINAL DISTENTION: 0
COUGH: 0
DIARRHEA: 0
ABDOMINAL PAIN: 0
SHORTNESS OF BREATH: 0
NAUSEA: 1
CONSTIPATION: 0
VOMITING: 1

## 2022-10-03 ASSESSMENT — PAIN SCALES - GENERAL
PAINLEVEL_OUTOF10: 6
PAINLEVEL_OUTOF10: 4
PAINLEVEL_OUTOF10: 6
PAINLEVEL_OUTOF10: 4
PAINLEVEL_OUTOF10: 5
PAINLEVEL_OUTOF10: 6
PAINLEVEL_OUTOF10: 5
PAINLEVEL_OUTOF10: 7
PAINLEVEL_OUTOF10: 5
PAINLEVEL_OUTOF10: 6
PAINLEVEL_OUTOF10: 5

## 2022-10-03 ASSESSMENT — PAIN DESCRIPTION - DESCRIPTORS
DESCRIPTORS: ACHING
DESCRIPTORS: ACHING;STABBING
DESCRIPTORS: ACHING
DESCRIPTORS: ACHING

## 2022-10-03 ASSESSMENT — PAIN DESCRIPTION - ORIENTATION
ORIENTATION: ANTERIOR
ORIENTATION: LEFT
ORIENTATION: LEFT

## 2022-10-03 ASSESSMENT — LIFESTYLE VARIABLES: HOW OFTEN DO YOU HAVE A DRINK CONTAINING ALCOHOL: NEVER

## 2022-10-03 NOTE — PLAN OF CARE
NEUROSURGERY PLAN OF CARE NOTE    Anna Paredes  [id number]   1975   10/3/2022    ED physician: Yahir Jeffries MD    Reason for call: SDH & SAH    History of present illness: Patient is a 56 y.o. male that  has no past medical history on file. Patient presented on 10/3/2022 to [facility] ED c/o headache x1 week. According to Dr. Jose Carlos Alvarado, the patient felt a pop behind his eye a week ago and has had a headache since. Patient has been taking aspirin for headache relief since onset a week ago. Physical Exam:     BP (!) 134/95   Pulse 66   Temp 98 °F (36.7 °C) (Oral)   Resp 15   Wt 260 lb (117.9 kg)   SpO2 100%   BMI 35.26 kg/m²   GCS per ED physician:  4 - Opens eyes on own  5 - Alert and oriented  6 - Follows simple motor commands    No focal deficits    Radiological Findings:  CT Head W/O Contrast  Result Date: 10/3/2022  Acute left extra-axial hemorrhage. There is a small left frontotemporal subdural hematoma. Left temporal subarachnoid hemorrhage component is also present. No midline shift. Correlation with CTA is pending. Critical results were called by Dr. Helena Starr MD to Adriana Gallagher on 10/3/2022 at 08:46. CTA HEAD NECK W CONTRAST  Result Date: 10/3/2022  No apparent arterial high grade stenosis, occlusion or aneurysm within the head or neck. Assessment:  Patient is a 56 y.o. y/o male w/headache x1 week. Spontaneous SDH and SAH seen on CT Head. Recommendations:  Neurologic exams frequency:  - ICU: Q1H  For change in exam MUST contact neurosurgery team  CT Head w/o contrast 6 hours from previous scan  MRI Brain w wo with MRA and MRV to evaluate for etiology of hemorrhages  Maintain SBP <160; If PRN med insufficient, then may start Nicardipine infusion  Keep Plt >100k & INR <1.4  Hold all anticoagulation & antiplatelet for 2 weeks  DVT Prophylaxis: SCD's  Seizure prophylaxis: Keppra 1000 mg loading dose then 500 mg BID  Detailed consult when patient arrives.     DISPO: Transfer to [facility] with hospitalist as attending physician and Dr. Devyn Rothman as consulting neurosurgeon.      Electronically signed by: MISSY Charles CNP, APRN-CNP, 10/3/2022 10:03 AM  [phone]

## 2022-10-03 NOTE — ED NOTES
80- called care flight   1840- ETA 40 mins  1845-called security       Shanakevin Bam  10/03/22 1852

## 2022-10-03 NOTE — ED PROVIDER NOTES
Emergency Physician Note        Note Open Time: 3:20 PM EDT    Chief Complaint  Headache (Felt a \"pop\" behind left eye,  has had headache for 7 days straight, )       History of Present Illness  Maria Isabel Rod is a 56 y.o. male who presents to the ED for headache. Patient reports that a week ago he felt a pop behind his left eye while seated talking at work. He denies any head injury the day before or the day of the onset of the headache. The pain has been constant since onset. He does have times when it worsens but actually it is currently slightly improved from its maximum. It was severe early on. He denies any trouble with his vision, speech or balance. No numbness or weakness of extremities. Patient reports history of a TIA a few years ago and because of this history he began taking aspirin this week in response to his headache. He states he took 81 mg aspirin every day for the last week. 10 systems reviewed, pertinent positives per HPI otherwise noted to be negative    I have reviewed the following from the nursing documentation:      Prior to Admission medications    Medication Sig Start Date End Date Taking? Authorizing Provider   aspirin 81 MG EC tablet Take 1 tablet by mouth daily 2/15/20   Jones Mccarthy MD   atorvastatin (LIPITOR) 40 MG tablet Take 1 tablet by mouth nightly 2/14/20   Jones Mccarthy MD       Allergies as of 10/03/2022    (No Known Allergies)       History reviewed. No pertinent past medical history. Surgical History: History reviewed. No pertinent surgical history. Family History:  History reviewed. No pertinent family history.     Social History     Socioeconomic History    Marital status:      Spouse name: Not on file    Number of children: Not on file    Years of education: Not on file    Highest education level: Not on file   Occupational History    Not on file   Tobacco Use    Smoking status: Never    Smokeless tobacco: Never   Substance and Sexual Activity Alcohol use: Not Currently    Drug use: Not Currently    Sexual activity: Yes     Partners: Female   Other Topics Concern    Not on file   Social History Narrative    Not on file     Social Determinants of Health     Financial Resource Strain: Not on file   Food Insecurity: Not on file   Transportation Needs: Not on file   Physical Activity: Not on file   Stress: Not on file   Social Connections: Not on file   Intimate Partner Violence: Not on file   Housing Stability: Not on file       Nursing notes reviewed. ED Triage Vitals   Enc Vitals Group      BP 10/03/22 0718 (!) 173/87      Heart Rate 10/03/22 0718 69      Resp 10/03/22 0718 18      Temp 10/03/22 0718 98 °F (36.7 °C)      Temp Source 10/03/22 0718 Oral      SpO2 10/03/22 0718 100 %      Weight 10/03/22 0721 260 lb (117.9 kg)      Height --       Head Circumference --       Peak Flow --       Pain Score --       Pain Loc --       Pain Edu? --       Excl. in GC? --        GENERAL:  Awake, alert. Well developed, well nourished with no apparent distress. HENT:  Normocephalic, Atraumatic, moist mucous membranes. EYES:  Pupils equal round and reactive to light, Conjunctiva normal, extraocular movements normal.  NECK:  No meningeal signs, Supple. CHEST:  Regular rate and rhythm, chest wall non-tender. LUNGS:  Clear to auscultation bilaterally. ABDOMEN:  Soft, non-tender, no rebound, rigidity or guarding, non-distended, normal bowel sounds. No costovertebral angle tenderness to palpation. BACK:  No tenderness. EXTREMITIES:  Normal range of motion, no edema, no bony tenderness, no deformity, distal pulses present. SKIN: Warm, dry and intact. Dirk Jimenez, alert and oriented to person, place and time. Strength 5/5 in bilateral upper and lower extremities. Sensation is intact to light touch in the upper and lower extremities. Cranial Nerves 2-12 are intact. Patellar DTRs intact. Finger-to-nose intact. Heel-to-shin intact.      RADIOLOGY  X-RAYS: I have reviewed radiologic plain film image(s). ALL OTHER NON-PLAIN FILM IMAGES SUCH AS CT, ULTRASOUND AND MRI HAVE BEEN READ BY THE RADIOLOGIST. CT Head W/O Contrast   Final Result   Acute left extra-axial hemorrhage. There is a small left frontotemporal   subdural hematoma. Left temporal subarachnoid hemorrhage component is also   present. No midline shift. Correlation with CTA is pending. Critical results were called by Dr. Claudetta Speed, MD to Brittany Combs   on 10/3/2022 at 08:46. CTA HEAD NECK W CONTRAST   Final Result   No apparent arterial high grade stenosis, occlusion or aneurysm within the   head or neck. LABS  Labs Reviewed   COMPREHENSIVE METABOLIC PANEL W/ REFLEX TO MG FOR LOW K - Abnormal; Notable for the following components:       Result Value    Glucose 112 (*)     ALT 47 (*)     All other components within normal limits   CBC WITH AUTO DIFFERENTIAL   PROTIME-INR   APTT         MEDICAL DECISION MAKING        I spoke with the on-call neurosurgery NP, Bo Sanches, and he recommended Keppra and blood pressure goal of systolic blood pressure under 160 and transfer to [facility] ICU. He stated that [facility] would also be acceptable. Patient was accepted by [facility] ICU but they told us they did not have any beds. Patient then waited for at least 4 hours with no bed available. Based on this I had the transfer center contact [facility] and the patient was accepted there by Dr. Miguel Iraheta. I gave the patient 1 g of Keppra and that dose of DDAVP for treatment of his aspirin induced platelet dysfunction. The total Critical Care time is 90 minutes which excludes separately billable procedures. The critical care was concerning treatment of intracranial hemorrhage with Keppra and DDAVP. This time is exclusive of any time documented by any other providers. I spoke with Dr. Steffen Ferrara ICU.  We thoroughly discussed the history, physical exam, laboratory and imaging studies, as well as, current course of treatment within the emergency department. Based upon that discussion, we've decided to transfer Lydia Rodriguez to [facility] AT [facility], for further observation and evaluation of Lydia Rodriguez current condition. As I have deemed necessary from their history, physical, and studies, I have considered and evaluated Lydia Rodriguez for the following diagnoses:      CLINICAL IMPRESSION:  1. Subarachnoid hemorrhage (HCC)    2. Subdural hemorrhage ([address]    3. Platelet dysfunction due to aspirin (HCC)        /79   Pulse 60   Temp 98 °F (36.7 °C) (Oral)   Resp 19   Wt 260 lb (117.9 kg)   SpO2 98%   BMI 35.26 kg/m²         Disposition  Pt is in stable condition upon Transfer to [facility] to ICU. This chart was generated using the [address] [place], [place] [address]  dictation system. I created this record but it may contain dictation errors.           Donnie Cooper MD  10/03/22 0133

## 2022-10-03 NOTE — ED NOTES
Assisted pt to bedside urinal. Pt stance was balanced and steady. Pt back in bed resting.       Lacey Kebede RN  10/03/22 6319

## 2022-10-03 NOTE — ED NOTES
1693 - called [facility] access to initiate poss transfer to [facility] hospital for neurosurgery  Re: [facility]  0902 - NP Bo Sanches on the line to speak with Dr Daphnie Guerrero. [facility] ICU is at capacity. Dr Daphnie Guerrero aware and stated pt is okay to stay in ED here until [facility] makes accommodations. [address] Dr Truong Arizmendi called back to speak with Dr Daphnie Guerrero. Pt accepted [facility] as a direct admit to ICU.  1310 - called [facility] access to get a possible ETA on bed assignment. Per Supervisor Gerri Rogel, pt should receive bed assignment in approx 3 hours  1325 - notified Dr Daphnie Guerrero regarding pt's bed assignment ETA at [facility] ICU.  1330 - reached out to [facility] pt placement to inquire if they have ICU beds available, they do. Per Dr Daphnie Guerrero, initiated transfer to [facility] instead. Dr Chad Bunn to call back to speak with Dr Daphnie Guerrero  4272 - Neuro ICU intensivist from [employer] [facility] called back to speak with Dr Daphnie Guerrero at this time. Dr Chad Bunn accepted the pt. Solomon Aschoff at [facility] pt placement notified. She will call with bed assignment  [address] called with bed assignment at [facility]. Will call Solomon Aschoff back with transport ETA. BED: 7101  N2N: [phone]  4102 - called [facility] access line to notify that pt has a bed at [address] with [facility]. [facility] will work on getting ALS transport to [address] called from [employer]. [facility] maybe ETA 1900 - may need to call them 1700 to make sure they still can take robin they prioritize [facility] d/t being contracted. 1545 - [employer] line called back and stated that since pt will have a bed by the time transport arrives to take pt to [facility] AT [facility], they want to have pt remain within the ministry and still go to [facility].Notified that MD Daphnie Guerrero has completed transfer papers and is no longer on shift. Call transferred to TOMEKA Ortiz and Charge RN Veronica Muhammadh aware.   [address] called back from [employer] line to speak with TOMEKA Ortiz at this time    65 - called [employer] to notify that per Director of nursing Yovanny Torres at [facility], if [address] transport can be scheduled before 1900 with another company to [facility] is willing to pay the ambulance transportation fee to keep pt in the [phone] called with a medic available for a 1930 transport. Notified that [facility] is paying for the pt's transport    [phone] - [facility] called with [facility] ICU bed assignment  BED: [facility],3M: [phone] - called Armen Tolentino at [address] [facility] pt placement to notify that pt is going to [facility] [email or web address] now. [phone] - called [facility] to notify that we can cancel pt's ALS transport to [facility].     [facility] called and stated that they cannot pick pt up at [address]. Their new ETA is now 2230.   1805 - called [facility] and asked for ALS avail. Previous 1900 ETA is not available. They have a current opening for [phone]. They are contracted as previously stated to [facility] and cannot  if needed by them first. Dayron Shipman, TOMEKA Mcgee, and [employer] aware of current change. [address] called [facility] access to notify that ambulance delayed from [facility] 49.    [address] Natasha Mcgee on the line with [facility] at this time       Jeannette Michaels  10/03/22 1821

## 2022-10-03 NOTE — ED PROVIDER NOTES
I was asked to reevaluate this patient because he was complaining of increased pressure in the posterior aspect of his head and increased nausea. In short this patient presented with a 1 week headache and was found to have subarachnoid and subdural hematoma. He is awaiting transport to the [facility].  Initially the [facility] did not have any available beds so transferred to [facility] was planned however [facility] called back and is making a bed for the patient. He remains awake and alert. NIH stroke scale is 0. He just states increased headache and nausea. Repeat head CT was done and shows no acute interval changes. He was given additional pain and nausea medicine here.   We are having difficulty arranging transport for this patient and do not want to delay his transport any longer so we have called for air transport to the [facility].    Results for orders placed or performed during the hospital encounter of 10/03/22   CBC with Auto Differential   Result Value Ref Range    WBC 9.6 4.0 - 11.0 K/uL    RBC 5.02 4.20 - 5.90 M/uL    Hemoglobin 15.8 13.5 - 17.5 g/dL    Hematocrit 46.0 40.5 - 52.5 %    MCV 91.5 80.0 - 100.0 fL    MCH 31.4 26.0 - 34.0 pg    MCHC 34.3 31.0 - 36.0 g/dL    RDW 12.8 12.4 - 15.4 %    Platelets 013 211 - 403 K/uL    MPV 8.7 5.0 - 10.5 fL    Neutrophils % 74.9 %    Lymphocytes % 16.8 %    Monocytes % 6.2 %    Eosinophils % 1.4 %    Basophils % 0.7 %    Neutrophils Absolute 7.1 1.7 - 7.7 K/uL    Lymphocytes Absolute 1.6 1.0 - 5.1 K/uL    Monocytes Absolute 0.6 0.0 - 1.3 K/uL    Eosinophils Absolute 0.1 0.0 - 0.6 K/uL    Basophils Absolute 0.1 0.0 - 0.2 K/uL   CMP w/ Reflex to MG   Result Value Ref Range    Sodium 136 136 - 145 mmol/L    Potassium reflex Magnesium 4.5 3.5 - 5.1 mmol/L    Chloride 101 99 - 110 mmol/L    CO2 26 21 - 32 mmol/L    Anion Gap 9 3 - 16    Glucose 112 (H) 70 - 99 mg/dL    BUN 16 7 - 20 mg/dL    Creatinine 0.9 0.9 - 1.3 mg/dL    GFR Non- >60 >60    GFR African American >60 >60    Calcium 9.5 8.3 - 10.6 mg/dL    Total Protein 7.7 6.4 - 8.2 g/dL    Albumin 4.9 3.4 - 5.0 g/dL    Albumin/Globulin Ratio 1.8 1.1 - 2.2    Total Bilirubin 0.7 0.0 - 1.0 mg/dL    Alkaline Phosphatase 113 40 - 129 U/L    ALT 47 (H) 10 - 40 U/L    AST 27 15 - 37 U/L   Protime-INR   Result Value Ref Range    Protime 13.6 11.7 - 14.5 sec    INR 1.06 0.87 - 1.14   APTT   Result Value Ref Range    aPTT 27.8 23.0 - 34.3 sec         CT HEAD WO CONTRAST   Final Result   No significant interval change in left convexity and left tentorial subdural   hemorrhage. CT Head W/O Contrast   Final Result   Acute left extra-axial hemorrhage. There is a small left frontotemporal   subdural hematoma. Left temporal subarachnoid hemorrhage component is also   present. No midline shift. Correlation with CTA is pending. Critical results were called by Dr. Wilfred Palacios MD to Charlotte Gonzalez   on 10/3/2022 at 08:46. CTA HEAD NECK W CONTRAST   Final Result   No apparent arterial high grade stenosis, occlusion or aneurysm within the   head or neck.                 Risa Rico MD  10/03/22 8515

## 2022-10-03 NOTE — ED NOTES
Spoke to Derek Albright from [facility] pertaining to patient transfer. Message was received that [facility] stated they should have a bed ready prior to transport arrival at [facility], and desire to keep patient within system was expressed. Derek Albright was informed that the decision to change transfer locations was not preferred due to the following; Pt currently has bed assigned at [facility], however, no bed is currently assigned at [facility] which creates a possibility for planning failure. Transport is set-up to go to [facility]. Additionally, transport often cancels runs due to location changes due to the fact they plan follow-on runs on the transport end locations. This could create a delay in care on a patient who has already been in the ED for 9.5 hours. The attending has left and the EMTALA form is only completed for [facility]. When Derek Albright asked if I was a charge nurse I responded that I am not but the charge nurse is in agreement, as it was discussed prior to me answering the call. After the end of the call [facility] ED attending Dr. Mela Christiansen was informed of the situation and she agreed that the patient should continue to be transferred to [facility] [employer] as planned. Charge RN spoke to clinical , and writer spoke to ED manager, who all are in agreement that transfer to [facility] [employer] should remain in place. Derek Albright called back stating that [facility] was still unclear about the rationale for not transferring to [facility]. The aforementioned reasons were recited again. Derek Albright offered to connect the call with [facility] to avoid the number of phone calls. However, when returning to the line it was stated that [facility] has escalated to administration.      Kinsey Vasquez RN  10/03/22 8783

## 2022-10-04 LAB
AMPHETAMINE SCREEN, URINE: ABNORMAL
ANION GAP SERPL CALCULATED.3IONS-SCNC: 13 MMOL/L (ref 3–16)
BARBITURATE SCREEN URINE: ABNORMAL
BENZODIAZEPINE SCREEN, URINE: ABNORMAL
BUN BLDV-MCNC: 15 MG/DL (ref 7–20)
CALCIUM SERPL-MCNC: 8.9 MG/DL (ref 8.3–10.6)
CANNABINOID SCREEN URINE: ABNORMAL
CHLORIDE BLD-SCNC: 100 MMOL/L (ref 99–110)
CO2: 22 MMOL/L (ref 21–32)
COCAINE METABOLITE SCREEN URINE: ABNORMAL
CREAT SERPL-MCNC: 0.8 MG/DL (ref 0.9–1.3)
ESTIMATED AVERAGE GLUCOSE: 99.7 MG/DL
FENTANYL SCREEN, URINE: POSITIVE
GFR AFRICAN AMERICAN: >60
GFR NON-AFRICAN AMERICAN: >60
GLUCOSE BLD-MCNC: 104 MG/DL (ref 70–99)
HBA1C MFR BLD: 5.1 %
HCT VFR BLD CALC: 41.5 % (ref 40.5–52.5)
HEMOGLOBIN: 14.4 G/DL (ref 13.5–17.5)
Lab: ABNORMAL
MCH RBC QN AUTO: 31.7 PG (ref 26–34)
MCHC RBC AUTO-ENTMCNC: 34.6 G/DL (ref 31–36)
MCV RBC AUTO: 91.6 FL (ref 80–100)
METHADONE SCREEN, URINE: ABNORMAL
OPIATE SCREEN URINE: ABNORMAL
OXYCODONE URINE: POSITIVE
PDW BLD-RTO: 13 % (ref 12.4–15.4)
PH UA: 7
PHENCYCLIDINE SCREEN URINE: ABNORMAL
PLATELET # BLD: 200 K/UL (ref 135–450)
PMV BLD AUTO: 8.9 FL (ref 5–10.5)
POTASSIUM SERPL-SCNC: 4.8 MMOL/L (ref 3.5–5.1)
RBC # BLD: 4.53 M/UL (ref 4.2–5.9)
SODIUM BLD-SCNC: 135 MMOL/L (ref 136–145)
TRICYCLIC, URINE: ABNORMAL
WBC # BLD: 11.4 K/UL (ref 4–11)

## 2022-10-04 PROCEDURE — 6370000000 HC RX 637 (ALT 250 FOR IP)

## 2022-10-04 PROCEDURE — 2580000003 HC RX 258: Performed by: STUDENT IN AN ORGANIZED HEALTH CARE EDUCATION/TRAINING PROGRAM

## 2022-10-04 PROCEDURE — 97530 THERAPEUTIC ACTIVITIES: CPT

## 2022-10-04 PROCEDURE — 6360000002 HC RX W HCPCS: Performed by: NURSE PRACTITIONER

## 2022-10-04 PROCEDURE — 6370000000 HC RX 637 (ALT 250 FOR IP): Performed by: STUDENT IN AN ORGANIZED HEALTH CARE EDUCATION/TRAINING PROGRAM

## 2022-10-04 PROCEDURE — 6360000002 HC RX W HCPCS

## 2022-10-04 PROCEDURE — 83036 HEMOGLOBIN GLYCOSYLATED A1C: CPT

## 2022-10-04 PROCEDURE — 36415 COLL VENOUS BLD VENIPUNCTURE: CPT

## 2022-10-04 PROCEDURE — 80307 DRUG TEST PRSMV CHEM ANLYZR: CPT

## 2022-10-04 PROCEDURE — 6360000002 HC RX W HCPCS: Performed by: STUDENT IN AN ORGANIZED HEALTH CARE EDUCATION/TRAINING PROGRAM

## 2022-10-04 PROCEDURE — 99222 1ST HOSP IP/OBS MODERATE 55: CPT | Performed by: INTERNAL MEDICINE

## 2022-10-04 PROCEDURE — 97535 SELF CARE MNGMENT TRAINING: CPT

## 2022-10-04 PROCEDURE — 2060000000 HC ICU INTERMEDIATE R&B

## 2022-10-04 PROCEDURE — 97165 OT EVAL LOW COMPLEX 30 MIN: CPT

## 2022-10-04 PROCEDURE — APPNB45 APP NON BILLABLE 31-45 MINUTES

## 2022-10-04 PROCEDURE — 85027 COMPLETE CBC AUTOMATED: CPT

## 2022-10-04 PROCEDURE — 80048 BASIC METABOLIC PNL TOTAL CA: CPT

## 2022-10-04 PROCEDURE — 6370000000 HC RX 637 (ALT 250 FOR IP): Performed by: NURSE PRACTITIONER

## 2022-10-04 PROCEDURE — 92610 EVALUATE SWALLOWING FUNCTION: CPT

## 2022-10-04 PROCEDURE — 97161 PT EVAL LOW COMPLEX 20 MIN: CPT

## 2022-10-04 RX ORDER — OXYCODONE HYDROCHLORIDE 5 MG/1
5 TABLET ORAL EVERY 6 HOURS PRN
Status: DISCONTINUED | OUTPATIENT
Start: 2022-10-04 | End: 2022-10-05

## 2022-10-04 RX ORDER — GABAPENTIN 100 MG/1
100 CAPSULE ORAL 3 TIMES DAILY
Status: DISCONTINUED | OUTPATIENT
Start: 2022-10-04 | End: 2022-10-06

## 2022-10-04 RX ORDER — FENTANYL CITRATE 50 UG/ML
25 INJECTION, SOLUTION INTRAMUSCULAR; INTRAVENOUS
Status: DISCONTINUED | OUTPATIENT
Start: 2022-10-04 | End: 2022-10-05

## 2022-10-04 RX ORDER — HEPARIN SODIUM 5000 [USP'U]/ML
5000 INJECTION, SOLUTION INTRAVENOUS; SUBCUTANEOUS EVERY 8 HOURS SCHEDULED
Status: COMPLETED | OUTPATIENT
Start: 2022-10-04 | End: 2022-10-05

## 2022-10-04 RX ADMIN — OXYCODONE 5 MG: 5 TABLET ORAL at 20:05

## 2022-10-04 RX ADMIN — ACETAMINOPHEN 650 MG: 325 TABLET, FILM COATED ORAL at 07:50

## 2022-10-04 RX ADMIN — PROMETHAZINE HYDROCHLORIDE 12.5 MG: 25 TABLET ORAL at 04:44

## 2022-10-04 RX ADMIN — HEPARIN SODIUM 5000 UNITS: 5000 INJECTION INTRAVENOUS; SUBCUTANEOUS at 16:42

## 2022-10-04 RX ADMIN — GABAPENTIN 100 MG: 100 CAPSULE ORAL at 22:31

## 2022-10-04 RX ADMIN — FENTANYL CITRATE 25 MCG: 50 INJECTION, SOLUTION INTRAMUSCULAR; INTRAVENOUS at 14:14

## 2022-10-04 RX ADMIN — SODIUM CHLORIDE 500 MG: 9 INJECTION, SOLUTION INTRAVENOUS at 09:01

## 2022-10-04 RX ADMIN — OXYCODONE 5 MG: 5 TABLET ORAL at 12:37

## 2022-10-04 RX ADMIN — SODIUM CHLORIDE 500 MG: 9 INJECTION, SOLUTION INTRAVENOUS at 22:37

## 2022-10-04 RX ADMIN — ACETAMINOPHEN 650 MG: 325 TABLET, FILM COATED ORAL at 03:04

## 2022-10-04 RX ADMIN — FENTANYL CITRATE 25 MCG: 50 INJECTION, SOLUTION INTRAMUSCULAR; INTRAVENOUS at 15:20

## 2022-10-04 RX ADMIN — PROMETHAZINE HYDROCHLORIDE 12.5 MG: 25 TABLET ORAL at 11:06

## 2022-10-04 RX ADMIN — ACETAMINOPHEN 650 MG: 325 TABLET, FILM COATED ORAL at 22:40

## 2022-10-04 RX ADMIN — HEPARIN SODIUM 5000 UNITS: 5000 INJECTION INTRAVENOUS; SUBCUTANEOUS at 22:32

## 2022-10-04 RX ADMIN — GABAPENTIN 100 MG: 100 CAPSULE ORAL at 16:42

## 2022-10-04 RX ADMIN — SODIUM CHLORIDE 500 MG: 9 INJECTION, SOLUTION INTRAVENOUS at 00:17

## 2022-10-04 ASSESSMENT — PAIN SCALES - GENERAL
PAINLEVEL_OUTOF10: 8
PAINLEVEL_OUTOF10: 7
PAINLEVEL_OUTOF10: 5
PAINLEVEL_OUTOF10: 5
PAINLEVEL_OUTOF10: 6
PAINLEVEL_OUTOF10: 6
PAINLEVEL_OUTOF10: 3
PAINLEVEL_OUTOF10: 6
PAINLEVEL_OUTOF10: 7
PAINLEVEL_OUTOF10: 4
PAINLEVEL_OUTOF10: 6
PAINLEVEL_OUTOF10: 3
PAINLEVEL_OUTOF10: 8
PAINLEVEL_OUTOF10: 7
PAINLEVEL_OUTOF10: 5
PAINLEVEL_OUTOF10: 7

## 2022-10-04 ASSESSMENT — PAIN DESCRIPTION - ORIENTATION
ORIENTATION: LEFT;ANTERIOR
ORIENTATION: ANTERIOR
ORIENTATION: ANTERIOR
ORIENTATION: ANTERIOR;LEFT
ORIENTATION: ANTERIOR

## 2022-10-04 ASSESSMENT — PAIN DESCRIPTION - DESCRIPTORS
DESCRIPTORS: ACHING
DESCRIPTORS: STABBING
DESCRIPTORS: ACHING
DESCRIPTORS: ACHING
DESCRIPTORS: STABBING
DESCRIPTORS: ACHING

## 2022-10-04 ASSESSMENT — PAIN DESCRIPTION - ONSET: ONSET: ON-GOING

## 2022-10-04 ASSESSMENT — PAIN DESCRIPTION - LOCATION
LOCATION: HEAD
LOCATION: HEAD
LOCATION: HEAD;NECK
LOCATION: HEAD
LOCATION: HEAD
LOCATION: HEAD;NECK
LOCATION: HEAD

## 2022-10-04 ASSESSMENT — PAIN DESCRIPTION - FREQUENCY: FREQUENCY: CONTINUOUS

## 2022-10-04 ASSESSMENT — PAIN - FUNCTIONAL ASSESSMENT
PAIN_FUNCTIONAL_ASSESSMENT: ACTIVITIES ARE NOT PREVENTED
PAIN_FUNCTIONAL_ASSESSMENT: ACTIVITIES ARE NOT PREVENTED

## 2022-10-04 ASSESSMENT — PAIN DESCRIPTION - PAIN TYPE: TYPE: ACUTE PAIN

## 2022-10-04 NOTE — PROGRESS NOTES
Clinical Pharmacy Progress Note    All IVs in NS - Management by Pharmacy    Consult Date(s): 10/3  Consulting Provider(s): Dr Brent Fernando / Plan  Acute SDH + SAH   - All IVs in Normal Saline  Drips will be adjusted to normal saline as appropriate based on compatibility, in an effort to avoid fluid shifts, as D5W is osmotically active. The following intermittent IV drips / infusions have been adjusted to saline:  Levetiracetam  If ordered, the following medications must remain in D5W due to incompatibility with normal saline:  None at this time  Note: Patient may have dextrose ordered as part of hypoglycemia treatment protocol. Total IV fluid delivered to patient over last 24 hrs: ~797 mL  Pharmacist will follow daily to ensure all IVPBs / drips are in NS where possible. Thank you for consulting Pharmacy!     David Aguirre PharmD., [facility]   10/4/2022 2:14 PM  Wireless: [phone]

## 2022-10-04 NOTE — PROGRESS NOTES
Speech Language Pathology  Facility/Department: [facility] ICU   CLINICAL BEDSIDE SWALLOW EVALUATION  Speech, language, cognitive screen  Discharge     NAME: Raad Barth  : 1975  MRN: [id number]    ADMISSION DATE: 10/3/2022  ADMITTING DIAGNOSIS: has TIA (transient ischemic attack); Paresthesia; Subarachnoid hemorrhage ([address] and Subdural hematoma on their problem list.  ONSET DATE: 10/3/22    Recent Chest Xray  Not performed this visit     CT of head 10/3/22     No significant interval change in left convexity and left tentorial subdural   hemorrhage. MRI brain 10/4/22  FINDINGS: There is a stable thin left-sided subdural hematoma along the left cerebral convexity and tentorium best demonstrated on the postcontrast FLAIR sequence. The maximum thickness of the left-sided subdural hematoma along the left cerebral    convexity is 3 mm (series 12 image 27). The thin left-sided subdural hematoma is superficial to the left frontal, temporal, and parietal lobes. This component of the left-sided subdural hematoma is difficult to visualize on previous noncontrast CT scans    although this is likely related to the small size of the subdural hematoma. There is no significant change since prior study. There is no mass effect. The ventricles and basal cisterns are normal in size and configuration. The brain is normal in signal    intensity. No abnormal enhancement. Major arterial and venous structures are patent. There is no diffusion restriction.        Date of Eval: 10/4/2022  Evaluating Therapist: SARAAVNAN Magallanes    Current Diet level:  Current Diet : NPO      Primary Complaint  Patient Complaint: pt is without complaints    Pain:  Pain Assessment  Pain Assessment: 0-10  Pain Level: 5  Patient's Stated Pain Goal: 0 - No pain  Pain Location: Head, Neck  Pain Orientation: Anterior  Pain Descriptors: Aching    Reason for Referral  Raad Barth was referred for a bedside swallow evaluation to assess the efficiency of his swallow function, identify signs and symptoms of aspiration and make recommendations regarding safe dietary consistencies, effective compensatory strategies, and safe eating environment. History of Present Illness  Rachel Salas is a 56 y.o. male who presents to the ED for headache. Patient reports that a week ago he felt a pop behind his left eye while seated talking at work. He denies any head injury the day before or the day of the onset of the headache. The pain has been constant since onset. He does have times when it worsens but actually it is currently slightly improved from its maximum. It was severe early on. He denies any trouble with his vision, speech or balance. No numbness or weakness of extremities. Patient reports history of a TIA a few years ago and because of this history he began taking aspirin this week in response to his headache. He states he took 81 mg aspirin every day for the last week. 10 systems reviewed, pertinent positives per HPI otherwise noted to be negative    Impression  Obtained clearance to see pt from LAKSHMI De La Paz with neurosurgery. Pt was alert and oriented x3. Speech is clear with no instances of word finding difficulty. Pt able to follow commands and respond to questions appropriately. ROM of oral structures was [place]. Pt had no difficulty closing lips around spoon or drawing liquid up a straw. Pt had no difficulty with mastication with solids. There was no anterior spillage or oral residue noted with any consistency. Pt demonstrated no s/s aspiration with any consistency presented. Pt able to initiate swallow without difficulty with laryngeal movement observed. Vocal quality remained clear throughout. No coughing, throat clearing or choking observed with any consistency. Pt consumed 3oz water uninterrupted by cup without difficulty.     Dysphagia Diagnosis: Swallow function appears [facility]  Dysphagia Outcome Severity Scale: Level 7: Normal in all situations     Treatment Plan  Requires SLP Intervention: No     D/C Recommendations: No follow up therapy recommended post discharge       Recommended Diet and Intervention    Diet recommendation- regular with thin liquids-Make NPO if s/s aspiration emerge and alert SLP       Recommended Form of Meds: PO          Compensatory Swallowing Strategies  Compensatory Swallowing Strategies : Upright as possible for all oral intake    Treatment/Goals   1-The pt/caregiver will demonstrate understanding of swallowing recommendations and concerns. 10/4- goal met The pt was educated to purpose of the visit, anatomy and physiology of the swallow, s/s of aspiration, swallowing strategies, diet recommendations, possibility of being made NPO if s/s aspiration emerge, plan to discharge from Speech Therapy and was encouraged to alert staff if difficulty with swallowing or communication emerge. The pt stated comprehension and agreement       General  Chart Reviewed: Yes  Behavior/Cognition: Cooperative; Alert;Pleasant mood  Respiratory Status: Room air  Communication Observation: Functional  Follows Directions: Complex  Dentition: Adequate  Patient Positioning: Upright in bed  Baseline Vocal Quality: Normal  Volitional Cough: Strong  Prior Dysphagia History: no history of dysphagia           Vision/Hearing  Vision  Vision: Within Functional Limits  Hearing  Hearing: Within functional limits            Prognosis  Individuals consulted  Consulted and agree with results and recommendations: Patient;RN;Family member  Family member consulted: wife  RN Name: Martha Mansfiled  Patient Education: Role of SLP  Patient Education Response: Verbalizes understanding  Safety Devices in place: Yes  Type of devices: Call light within reach       Therapy Time  SLP Individual Minutes  Time In: 1020  Time Out: 1040  Minutes: 20          Pt's goal:pt denied difficulty with communication and swallowing so did not state goal Plan:  Communication and swallowing appear to be [facility]. Pt discharged from Speech Therapy services. Recommended diet:regular with thin liquids   Total treatment time:20  Pt's discharge plan:to home   Discharge Plan: No further follow up indicated unless there is a change in status then please re-refer  Discussed with RN, Martha   Needs within reach.        Mirela Honeycutt [place], [facility]- [address] [facility]  Pg # [phone]  This document will serve as a discharge summary if pt discharge before next treatment   session

## 2022-10-04 NOTE — PROGRESS NOTES
Clinical Pharmacy Consult Note    56 y.o. male admitted with SDH and SAH. Pharmacy has been asked by Dr. Bebe Ruiz DO to adjust all drips to normal saline as appropriate based on compatibility to avoid fluid shifts since D5 is osmotically active. The following intermittent IV drips/infusions have been adjusted to saline:  Keppra (already in NS)    The following medications must remain in D5W due to incompatibility with normal saline:  Amphotericin  Mycophenolate  Nitroprusside  Penicillin G Potassium    Please be aware that patient has D5W ordered as part of hypoglycemia orderset. Total IV fluid delivered to patient over last 24h: RUBÉN Mcdonald    [facility] will follow daily to ensure all new IVPBs + drips are in NS. Please call with questions.   Marcela GibsonD  Main Pharmacy: [id number]  10/3/2022 8:45 PM

## 2022-10-04 NOTE — PROGRESS NOTES
Neurology / Neurocritical Care Consult Note    Frances Wall MD is requesting this consult. Reason for Consult: Subarachnoid hemorrhage   Admission Chief Complaint: Headache     History of Present Illness     Adriana Sánchez is a 56 y.o. y/o male with PMH significant for TIA (2020) who presented to [facility] on 10/3 with complaints of a headache for 1 week. 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 Following this he had a mild 4/10, left sided headache. At that time, he denies any other symptoms such as persistent visual disturbance, dizziness, speech changes, numbness, tingling or lateralizing weakness. As the week went on, the left sided headache built in intensity, unrelieved by tylenol or NSAIDS. Coughing, sneezing, quick position changes made it worse. By the weekend, he rated the headache at a 8/10 and began having some nausea and fatigue. He started taking an aspirin daily to see if this would help the headache but it did not. By Monday, the headache continued to intensify and he was vomiting so he then decided to seek medical evaluation. His last dose of aspirin was yesterday morning. Upon arrival to the ED, he had a CT that showed an acute small left frontotemporal SDH and small left temporal SAH. CTA was unremarkable. Neurosurgery was consulted who recommended starting the patient on Keppra, blood pressure management and transfer to [facility] for neurosurgical evaluation. His vital signs were stable. Upon arrival to [facility] he was neurologically stable. He had and MRI with and without contrast, MRV, and MRA that were all without any acute findings or abnormality.      The patient reports a history of TIA back in 2020 when he had a sudden onset of left facial numbness that lasted for 6-8 hours. He went to ED and was worked up for stroke. He had an MRI and CTA at that time that was unremarkable. He was discharged and instructed to take 81mg aspirin and a statin which he admits he stopped taking a while ago. He denies a history of diabetes or hypertension and denies smoking, alcohol use or illicit drugs or recent head trauma. He states the weeks leading up to this event, he had COVID that was pretty severe. He was sick for a few weeks and had many intense coughing spells but otherwise cant think of anything else that might have caused this bleeding to happen. REVIEW OF SYSTEMS:   Constitutional- No weight loss or fevers   Eyes- No diplopia. No photophobia. Ears/nose/throat- No dysphagia. No Dysarthria   Cardiovascular- No palpitations. No chest pain   Respiratory- No dyspnea. No Cough   Gastrointestinal- No Abdominal pain. No Vomiting. Genitourinary- No incontinence. No urinary retention   Musculoskeletal- No myalgia. No arthralgia   Skin- No rash. No easy bruising. Psychiatric- No depression. No anxiety   Endocrine- No diabetes. No thyroid issues. Hematologic- No bleeding difficulty. No fatigue   Neurologic- +headache 4/10. Denies visual changes, numbness, tingling, lateralizing weakness     Past Medical, Surgical, Family, and Social History   PAST MEDICAL HISTORY:  No past medical history on file. SURGICAL HISTORY:  No past surgical history on file. FAMILY HISTORY & SOCIAL HISTORY:  Family history non-contributory  No family history on file.   Social History     Tobacco Use    Smoking status: Never    Smokeless tobacco: Never   Substance Use Topics    Alcohol use: Not Currently    Drug use: Not Currently         Allergies & Outpatient Medications   ALLERGIES:  No Known Allergies  HOME MEDICATIONS:  Current Discharge Medication List        CONTINUE these medications which have NOT CHANGED    Details   aspirin 81 MG EC tablet Take 1 tablet by mouth daily  Qty: 30 tablet, Refills: 3      atorvastatin (LIPITOR) 40 MG tablet Take 1 tablet by mouth nightly  Qty: 30 tablet, Refills: 1               Physical Exam   PHYSICAL EXAM:  Vitals:    10/04/22 0400 10/04/22 0500 10/04/22 0600 10/04/22 0700   BP: 120/69 124/72 112/66 122/75   Pulse: 53 53 (!) 49 50   Resp:       Temp: 98.9 °F (37.2 °C)   98 °F (36.7 °C)   TempSrc: Oral   Oral   SpO2: 96%  97% 97%   Weight:       Height:             General: Alert, no distress, well-nourished  Neurologic  Mental status:   orientation to person, place, time, situation   Attention intact as able to attend well to the exam     Language fluent in conversation   Comprehension intact; follows simple commands    Cranial nerves:   CN2: Visual fields full w/o extinction on confrontational testing   CN 3,4,6: Pupils equal and reactive to light, extraocular muscles intact  CN5: Facial sensation symmetric   CN7: Face symmetric  CN8: Hearing symmetric to spoken voice  CN9: Palate elevated symmetrically  CN11: Traps full strength on shoulder shrug  CN12: Tongue midline with protrusion    Motor Exam:   R  L    Deltoid 5  5   Biceps 5 5   Triceps 5 5   Wrist extension  5 5   Interossei 5 5      R  L    Hip flexion  5  5   Hip extension  5 5   Knee flexion  5 5   Knee extension  5 5   Ankle dorsiflexion  5 5   Ankle plantar flexion  5 5     Sensory: light touch intact and symmetric in all 4 extremities. No sensory extinction on bilateral simultaneous stimulation  Cerebellar/coordination: finger nose finger normal without ataxia  Tone: normal in all 4 extremities  Gait: did not test at this time. Patient states he has been up to the bathroom and did not have any dizziness or gait instability. OTHER SYSTEMS:  Cardiovascular: Warm, appears well perfused   Respiratory: Easy, non-labored respiratory pattern   Abdominal: Abdomen is without distention   Extremities: Upper and lower extremities are atraumatic in appearance without deformity.  No swelling or erythema. MODIFIED DON SCALE: 1  The patient has no significant disability; able to carry out all pre-stroke activities. Diagnostic Testing Results   IMAGES:  Images personally reviewed and agree w/ radiology interpretation. Head CT w/o Contrast: 10/3/22; 08:28  Impression   Acute left extra-axial hemorrhage. There is a small left frontotemporal   subdural hematoma. Left temporal subarachnoid hemorrhage component is also   present. No midline shift. Head CT w/o Contrast: 10/3/22; 18:43  Impression   No significant interval change in left convexity and left tentorial subdural   hemorrhage. CTA of Head / Neck w/ Contrast: 10/3/22; 08:28  Impression   No apparent arterial high grade stenosis, occlusion or aneurysm within the   head or neck. MRI brain w/ without & MRA and MRV Brain w/o Contrast: 10/3/22; 23:56  Impression   1. Thin but fairly diffuse left convexity and tentorial subdural hematoma measuring up to 3 mm in thickness unchanged since previous noncontrast head CT. MRA BRAIN:       FINDINGS: The internal carotid arteries are patent and normal in caliber through the skull base. The middle cerebral and anterior cerebral arteries are patent and normal in caliber. The anterior communicating artery is normal. Posterior cerebral arteries    are patent bilaterally. The basilar artery is patent and normal in caliber. Distal vertebral arteries are patent and normal in caliber. IMPRESSION:   1. Normal MRA of the brain. MRV brain:       FINDINGS: The internal jugular veins, sigmoid sinuses, transverse sinuses, straight sinus, and superior sagittal sinus are widely patent and normal in appearance. IMPRESSION:   1. Normal MRV of the brain. LABS:  All results below personally reviewed. Pertinent positives & negatives are addressed in Impression & Recommendations below.      LABS   Metabolic Panel Recent Labs     10/03/22  0721 10/04/22  0337    135*   K 4.5 4.8    100   CO2 26 22   BUN 16 15   CREATININE 0.9 0.8*   GLUCOSE 112* 104*   CALCIUM 9.5 8.9   LABALBU 4.9  --    ALKPHOS 113  --    ALT 47*  --    AST 27  --       CBC / Coags Recent Labs     10/03/22  0721 10/03/22  0914 10/04/22  0312   WBC 9.6  --  11.4*   RBC 5.02  --  4.53   HGB 15.8  --  14.4   HCT 46.0  --  41.5     --  200   INR  --  1.06  --       Other No results for input(s): LABA1C, LDLCALC, TRIG, TSH, ZFVNMROD18, FOLATE, LABSALI, COVID19 in the last 72 hours. No results for input(s): PHENYTOIN, KEPPRA, LACOSA, LAMO, VALPROATE, LACTSEPSIS, LACTA in the last 72 hours. CURRENT SCHEDULED MEDICATIONS   Inpatient Medications     atorvastatin, 40 mg, Oral, Nightly    levETIRAcetam, 500 mg, IntraVENous, Q12H   Infusions      Antibiotics   Recent Abx Admin        No antibiotic orders with administrations found. IMPRESSION & RECOMMENDATIONS     IMPRESSION:  -55 year old male with a significant history of TIA (2020) who presented to the ED with a sudden onset, left sided headache that started after feeling a \"pop\" behind his left eye. The headache was mild at first but persisted over the week, increasing in intensity and was eventually accompanied by nausea and vomiting. He was found to have a small, spontaneous acute left frontotemporal SDH and left temporal SAH. -In light of negative MRI, MRA, MRV and CTA, the etiology of the bleed remains in question. Neurosurgery was consulted for possible angiogram to assess for vascular abnormalities. RECOMMENDATIONS:  -Appreciate neurosurgery recommendations. Plan for diagnostic cerebral angiogram likely Wednesday or Thursday.    -Hold all antiplatelets and anticoagulation for 2 weeks  -Q4 neuro checks. Okay to transfer to . -NIHSS once a shift and during handoff   -Continue Keppra 500mg BID  -Maintain SBP <160;  If PRN med insufficient after 30 minutes, start Nicardipine infusion  -Keep Plt >100k & INR <1.4  -Hold all anticoagulation & antiplatelet for 2 weeks (ok to give sq heparin for dvt prophylaxis)  -All IV infusions to be in Normal Saline. -SCD's for DVT prophylaxis while in bed  -SLP/PT/OT    Candy Doshi, APRN - [address] [facility]   Neurology & Neurocritical Care   10/4/2022 8:17 AM    ICU Patients:   Neurocritical Care Line: [phone]  [facility]: [facility] Neurocritical Care    Floor / PCU Patients:  Neurology Line: [phone]  [facility]: [facility] Neurology    Time independently spent by Nurse Practitioner reviewing chart and prior testing, obtaining history from patient, examining patient, ordering appropriate medications / diagnostics, reviewing results of diagnostics, counseling and educating patient / family, communicating plan with other providers and documenting clinical information in the EMR was approximately 40 minutes.

## 2022-10-04 NOTE — PROGRESS NOTES
Speech Language Pathology      Referral rec'd. Chart reviewed. Awaiting clearance from neurosurgery before initiating evaluation. Discussed with RN, Marco A Schwarz.     Peg Mura, [place], [address]  Speech-Language Pathologist  Pager [phone]

## 2022-10-04 NOTE — PROGRESS NOTES
Occupational / Physical Therapy  Hold   Pt remains on strict bedrest, awaiting neuro consult and clearance for therapy evals. Will follow up as treatment schedule allows.      Wan Landa, ALINA, OTR/L  [place] , [facility], DPT #[id number]

## 2022-10-04 NOTE — CONSULTS
Chart reviewed, events noted, and I have personally performed a face-to-face diagnostic evaluation on this patient. I have personally reviewed CNP's note and confirmed the documented past medical history, family history, social history, allergies, home medications, review of systems, vital signs, laboratory values, and hospital medications via my own chart review, in person with the patient, and/or in person with his family members as appropriate. My findings are as follows:    Assessment  - 52yo man presents with severe headache, which is new for him, and has persisted with worsening over the last 7 days, found to have very small SDH and SAH in left frontotemporal lobe  - It does not seem that size/degree of hemorrhage should explain the severe headache he has currently  - Would not explain blurring of vision which occurred at onset one week ago  - Even if hemorrhage was hyperacute at the time of the first symptoms one week ago, do not feel that CT would have changed this substantially in this amount of time, jose luis if symptoms have been worsening instead of improving  - Neurosurgery is planning for conventional angiogram     Recommendations  - Agree with Neurosurgery plan for angiogram  - SBP < 160  - No antiplatelets/anticoagulants x2 weeks, or at neurosurgery discretion  - Continue Keppra 500/500  - OK for q4 neuro checks  - CNP note, to which this attestation is attached, contains remainder of assessment details & recommendations. History of Present Illness:  52yo man with reported prior TIA in 2020. Presented to ER yesterday with headache for 1 week. He reports that 7 days ago he was at work when he had a sudden \"popping\" sensation behind his left eye followed by a feeling of warmth over the left side of his face and blurred vision in just the left eye. This lasted for only about 5 seconds. After this, he had a mild 4/10 left-sided headache. There were no other associated symptoms at this time.  As the week went on, the headache worsened and was unresponsive to OTC analgesics. Over the weekend, he began having nausea and increased fatigue and by Monday (yesterday) he began vomiting. He presented to the ER and was found to have an acute but very small left frontotemporal SDH and small left temporal SAH. CTA was unremarkable. He was transferred here for further workup. Currently, he is complaining of a severe headache on the left and down into the left face. He feels that the left face is actually tender to the touch. He otherwise feels completely neurologically intact. he is unsure what would have precipitated these symptoms, other than the above. he feels that nothing makes them better and nothing makes them worse. he rates the symptoms as severe. he denies any other associated symptoms including no other HA, no speech/language difficulties, no swallowing difficulties, no other visual changes, no diplopia, no hearing loss, no tinnitus, no vertigo, no imbalance, no light-headedness, no focal weakness, no sensory changes, no other nausea or vomiting. he has never had this problem before. There is no history of this problem or anything similar in his family members.      Neurological Exam (performed on 10/4/2022 at 1120):  -Mental status: A&O x3; pleasant & appropriate  -Memory: Recent & remote memory intact  -Attention: Normal  -Fund of Knowledge: Good  -Speech & Language: no aphasia; no dysarthria  -Cranial nerves: pupils 4mm->3mm bilaterally; fields intact; unable to visualize fundi; EOMI, no nystagmus; sensation intact V1, V2, V3; no facial asymmetry; hearing intact bilaterally; palate elevates symmetrically; SCMs & trapezii intact bilaterally; tongue midline  -Sensory: intact to lt touch throughout  -Motor:   RUE: 5/5, no pronator drift  RLE: 5/5  LUE: 5/5, no pronator drift  LLE: 5/5  -Tone: Normal throughout  -Reflexes: 1+ & symmetric throughout  -Coordination: FNF intact  -Gait & Station: deferred for pt safety  -Other: no adventitious movements noted  Other Systems  -General Appearance: well-developed, well-nourished, no apparent distress  -Neck: supple  -Lungs: breathing unlabored, regular, no audible wheezes  -CV: pulses strong x4 extremities  -Abd: flat  -Extrem: no c/c/e      Imaging & Testing:  MRI BRAIN W WO CONTRAST  Images independently reviewed. Agree with findings. --KACHORIS    1. Thin but fairly diffuse left convexity and tentorial subdural hematoma measuring up to 3 mm in thickness unchanged since previous noncontrast head CT. MRV HEAD W WO CONTRAST  Images independently reviewed. Agree with findings. --KACHORIS    1. Normal MRV of the brain. MRA HEAD WO CONTRAST  Images independently reviewed. Agree with findings. --KACHORIS    1. Normal MRA of the brain. Discussed with pt; pt's family member; nursing; and Isidro Weaver CNP    Time independently spent reviewing chart and prior testing, obtaining history from patient, examining patient, ordering appropriate medications / diagnostics, reviewing results of diagnostics, counseling and educating patient / family, communicating plan with other providers and documenting clinical information in the EMR was approximately 50 minutes. Paris Valles MD  Neurology  [phone]  10/4/2022                Neurology / Neurocritical Care Consult Note    Paul Reynoso MD is requesting this consult. Reason for Consult: Subarachnoid hemorrhage   Admission Chief Complaint: Headache     History of Present Illness     Maria Isabel Rod is a 56 y.o. y/o male with PMH significant for TIA (2020) who presented to [facility] on 10/3 with complaints of a headache for 1 week.      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 Following this he had a mild 4/10, left sided headache. At that time, he denies any other symptoms such as persistent visual disturbance, dizziness, speech changes, numbness, tingling or lateralizing weakness. As the week went on, the left sided headache built in intensity, unrelieved by tylenol or NSAIDS. Coughing, sneezing, quick position changes made it worse. By the weekend, he rated the headache at a 8/10 and began having some nausea and fatigue. He started taking an aspirin daily to see if this would help the headache but it did not. By Monday, the headache continued to intensify and he was vomiting so he then decided to seek medical evaluation. His last dose of aspirin was yesterday morning. Upon arrival to the ED, he had a CT that showed an acute small left frontotemporal SDH and small left temporal SAH. CTA was unremarkable. Neurosurgery was consulted who recommended starting the patient on Keppra, blood pressure management and transfer to [facility] for neurosurgical evaluation. His vital signs were stable. Upon arrival to [facility] he was neurologically stable. He had and MRI with and without contrast, MRV, and MRA that were all without any acute findings or abnormality. The patient reports a history of TIA back in 2020 when he had a sudden onset of left facial numbness that lasted for 6-8 hours. He went to ED and was worked up for stroke. He had an MRI and CTA at that time that was unremarkable. He was discharged and instructed to take 81mg aspirin and a statin which he admits he stopped taking a while ago. He denies a history of diabetes or hypertension and denies smoking, alcohol use or illicit drugs or recent head trauma. He states the weeks leading up to this event, he had COVID that was pretty severe. He was sick for a few weeks and had many intense coughing spells but otherwise cant think of anything else that might have caused this bleeding to happen.     REVIEW OF SYSTEMS: Constitutional- No weight loss or fevers   Eyes- No diplopia. No photophobia. Ears/nose/throat- No dysphagia. No Dysarthria   Cardiovascular- No palpitations. No chest pain   Respiratory- No dyspnea. No Cough   Gastrointestinal- No Abdominal pain. No Vomiting. Genitourinary- No incontinence. No urinary retention   Musculoskeletal- No myalgia. No arthralgia   Skin- No rash. No easy bruising. Psychiatric- No depression. No anxiety   Endocrine- No diabetes. No thyroid issues. Hematologic- No bleeding difficulty. No fatigue   Neurologic- +headache 4/10. Denies visual changes, numbness, tingling, lateralizing weakness     Past Medical, Surgical, Family, and Social History   PAST MEDICAL HISTORY:  No past medical history on file. SURGICAL HISTORY:  No past surgical history on file. FAMILY HISTORY & SOCIAL HISTORY:  Family history non-contributory  No family history on file.   Social History     Tobacco Use    Smoking status: Never    Smokeless tobacco: Never   Substance Use Topics    Alcohol use: Not Currently    Drug use: Not Currently         Allergies & Outpatient Medications   ALLERGIES:  No Known Allergies  HOME MEDICATIONS:  Current Discharge Medication List        CONTINUE these medications which have NOT CHANGED    Details   aspirin 81 MG EC tablet Take 1 tablet by mouth daily  Qty: 30 tablet, Refills: 3      atorvastatin (LIPITOR) 40 MG tablet Take 1 tablet by mouth nightly  Qty: 30 tablet, Refills: 1               Physical Exam   PHYSICAL EXAM:  Vitals:    10/04/22 0400 10/04/22 0500 10/04/22 0600 10/04/22 0700   BP: 120/69 124/72 112/66 122/75   Pulse: 53 53 (!) 49 50   Resp:       Temp: 98.9 °F (37.2 °C)   98 °F (36.7 °C)   TempSrc: Oral   Oral   SpO2: 96%  97% 97%   Weight:       Height:             General: Alert, no distress, well-nourished  Neurologic  Mental status:   orientation to person, place, time, situation   Attention intact as able to attend well to the exam     Language fluent in conversation   Comprehension intact; follows simple commands    Cranial nerves:   CN2: Visual fields full w/o extinction on confrontational testing   CN 3,4,6: Pupils equal and reactive to light, extraocular muscles intact  CN5: Facial sensation symmetric   CN7: Face symmetric  CN8: Hearing symmetric to spoken voice  CN9: Palate elevated symmetrically  CN11: Traps full strength on shoulder shrug  CN12: Tongue midline with protrusion    Motor Exam:   R  L    Deltoid 5  5   Biceps 5 5   Triceps 5 5   Wrist extension  5 5   Interossei 5 5      R  L    Hip flexion  5  5   Hip extension  5 5   Knee flexion  5 5   Knee extension  5 5   Ankle dorsiflexion  5 5   Ankle plantar flexion  5 5     Sensory: light touch intact and symmetric in all 4 extremities. No sensory extinction on bilateral simultaneous stimulation  Cerebellar/coordination: finger nose finger normal without ataxia  Tone: normal in all 4 extremities  Gait: did not test at this time. Patient states he has been up to the bathroom and did not have any dizziness or gait instability. OTHER SYSTEMS:  Cardiovascular: Warm, appears well perfused   Respiratory: Easy, non-labored respiratory pattern   Abdominal: Abdomen is without distention   Extremities: Upper and lower extremities are atraumatic in appearance without deformity. No swelling or erythema. MODIFIED DON SCALE: 1  The patient has no significant disability; able to carry out all pre-stroke activities. Diagnostic Testing Results   IMAGES:  Images personally reviewed and agree w/ radiology interpretation. Head CT w/o Contrast: 10/3/22; 08:28  Impression   Acute left extra-axial hemorrhage. There is a small left frontotemporal   subdural hematoma. Left temporal subarachnoid hemorrhage component is also   present. No midline shift. Head CT w/o Contrast: 10/3/22; 18:43  Impression   No significant interval change in left convexity and left tentorial subdural   hemorrhage.      CTA of Head / Neck w/ Contrast: 10/3/22; 08:28  Impression   No apparent arterial high grade stenosis, occlusion or aneurysm within the   head or neck. MRI brain w/ without & MRA and MRV Brain w/o Contrast: 10/3/22; 23:56  Impression   1. Thin but fairly diffuse left convexity and tentorial subdural hematoma measuring up to 3 mm in thickness unchanged since previous noncontrast head CT. MRA BRAIN:       FINDINGS: The internal carotid arteries are patent and normal in caliber through the skull base. The middle cerebral and anterior cerebral arteries are patent and normal in caliber. The anterior communicating artery is normal. Posterior cerebral arteries    are patent bilaterally. The basilar artery is patent and normal in caliber. Distal vertebral arteries are patent and normal in caliber. IMPRESSION:   1. Normal MRA of the brain. MRV brain:       FINDINGS: The internal jugular veins, sigmoid sinuses, transverse sinuses, straight sinus, and superior sagittal sinus are widely patent and normal in appearance. IMPRESSION:   1. Normal MRV of the brain. LABS:  All results below personally reviewed. Pertinent positives & negatives are addressed in Impression & Recommendations below. LABS   Metabolic Panel Recent Labs     10/03/22  0721 10/04/22  0337    135*   K 4.5 4.8    100   CO2 26 22   BUN 16 15   CREATININE 0.9 0.8*   GLUCOSE 112* 104*   CALCIUM 9.5 8.9   LABALBU 4.9  --    ALKPHOS 113  --    ALT 47*  --    AST 27  --       CBC / Coags Recent Labs     10/03/22  0721 10/03/22  0914 10/04/22  0312   WBC 9.6  --  11.4*   RBC 5.02  --  4.53   HGB 15.8  --  14.4   HCT 46.0  --  41.5     --  200   INR  --  1.06  --       Other No results for input(s): LABA1C, LDLCALC, TRIG, TSH, XAAQKTSJ43, FOLATE, LABSALI, COVID19 in the last 72 hours. No results for input(s): PHENYTOIN, KEPPRA, LACOSA, LAMO, VALPROATE, LACTSEPSIS, LACTA in the last 72 hours.        CURRENT SCHEDULED MEDICATIONS   Inpatient Medications     atorvastatin, 40 mg, Oral, Nightly    levETIRAcetam, 500 mg, IntraVENous, Q12H   Infusions      Antibiotics   Recent Abx Admin        No antibiotic orders with administrations found. IMPRESSION & RECOMMENDATIONS     IMPRESSION:  -55 year old male with a significant history of TIA (2020) who presented to the ED with a sudden onset, left sided headache that started after feeling a \"pop\" behind his left eye. The headache was mild at first but persisted over the week, increasing in intensity and was eventually accompanied by nausea and vomiting. He was found to have a small, spontaneous acute left frontotemporal SDH and left temporal SAH. -In light of negative MRI, MRA, MRV and CTA, the etiology of the bleed remains in question. Neurosurgery was consulted for possible angiogram to assess for vascular abnormalities. RECOMMENDATIONS:  -Appreciate neurosurgery recommendations. Plan for diagnostic cerebral angiogram likely Wednesday or Thursday.    -Hold all antiplatelets and anticoagulation for 2 weeks  -Q4 neuro checks. Okay to transfer to . -NIHSS once a shift and during handoff   -Continue Keppra 500mg BID  -Maintain SBP <160; If PRN med insufficient after 30 minutes, start Nicardipine infusion  -Keep Plt >100k & INR <1.4  -Hold all anticoagulation & antiplatelet for 2 weeks (ok to give sq heparin for dvt prophylaxis)  -All IV infusions to be in Normal Saline.   -SCD's for DVT prophylaxis while in bed  -SLP/PT/OT    Carloz Coleman, APRN - [facility]   Neurology & Neurocritical Care   10/4/2022 8:17 AM    ICU Patients:   Neurocritical Care Line: [phone]  [facility]: [facility] Neurocritical Care    Floor / PCU Patients:  Neurology Line: [phone]  [facility]: [facility] Neurology    Time independently spent by Nurse Practitioner reviewing chart and prior testing, obtaining history from patient, examining patient, ordering appropriate medications / diagnostics, reviewing results of diagnostics, counseling and educating patient / family, communicating plan with other providers and documenting clinical information in the EMR was approximately 40 minutes.

## 2022-10-04 NOTE — PROGRESS NOTES
Stroke Admission    I agree as the admission nurse that I have completed a thorough stroke assessment and completed the admission on the patient. ALL assessment areas listed below have been addressed and completed. Presentation: [facility]    Handoff assessment completed with RN. Current NIHSS 0.     [x]   Education Assessment  [x]   Individualized Stroke/TIA Education template added, including patient specific risk factors: High Cholesterol, Overweight, Lack of exercise, Personal history of previous Stroke/TIA, Personal history of heart disease, and Family history of heart disease  [x]   Individualized Stroke/TIA Care Plan template added  [x]   Bedside swallow screen completed using the [place] Swallow Protocol, and documented PRIOR to any PO meds, food or drink: Pass  [x]   VTE Prophylaxis: SCDs ordered/addressed; SCDs: On           (As a reminder, ASA, Plavix and TPA are not VTE prophylaxis.)  [x]   Stroke education booklet given, and education initiated with patient and/or caregiver      Nurse eSignature: Electronically signed by Mylene Orellana RN on 10/3/22 at 10:25 PM EDT

## 2022-10-04 NOTE — PROGRESS NOTES
Physical Therapy  Facility/Department: [facility]  Physical Therapy Initial Assessment    Name: Katie Bajwa  : 1975  MRN: [id number]  Date of Service: 10/4/2022    Discharge Recommendations: Katie Bajwa scored a 22/24 on the AM-PAC short mobility form. Current research shows that an AM-PAC score of 18 or greater is typically associated with a discharge to the patient's home setting. Based on the patient's AM-PAC score and their current functional mobility deficits, it is recommended that the patient have 2-3 sessions per week of Physical Therapy at d/c to increase the patient's independence. At this time, this patient demonstrates the endurance and safety to discharge home. Please see assessment section for further patient specific details. If patient discharges prior to next session this note will serve as a discharge summary. Please see below for the latest assessment towards goals. PT Equipment Recommendations  Equipment Needed: No      Patient Diagnosis(es): There were no encounter diagnoses. Past Medical History:  has no past medical history on file. Past Surgical History:  has no past surgical history on file. Assessment   Assessment: During today's evaluation pt was Mod I for all bed mobility and transfers with no AD. Pt had a 10/10 headache and was hungry during the session, pt unwilinging to ambulate more than to the restroom. PT would like to see pt for one more treatment in order to get a better gait and stair assessment before the patient returns home. PT rec the pt rtn home with inital 24 hr A. Will continue to follow.   Treatment Diagnosis: Functional mobility impairment  Therapy Prognosis: Excellent  Decision Making: Low Complexity  Requires PT Follow-Up: Yes  Activity Tolerance  Activity Tolerance: Patient tolerated treatment well     Plan   Physcial Therapy Plan  General Plan: Other (See Comment) (1 more time)  Current Treatment Recommendations: Gait training, Stair training  Safety Devices  Type of Devices: All fall risk precautions in place, Chair alarm in place, Left in chair, Nurse notified, Call light within reach (Simultaneous filing. User may not have seen previous data.)  Restraints  Restraints Initially in Place: No     Restrictions  Position Activity Restriction  Other position/activity restrictions: HOB elevated to 30 degrees     Subjective   General  Chart Reviewed: Yes  Patient assessed for rehabilitation services?: Yes (Simultaneous filing. User may not have seen previous data.)  Additional Pertinent Hx: Pt is a 55 yo male that presents to the hospital 10/3 with co of worsening headache over the past week. Pt states he felt a pop behind his eye and has a headache ever since. Head CT: Acute left extra-axial hemorrhage. There is a small left frontotemporal  subdural hematoma. Left temporal subarachnoid hemorrhage component is also  present. PMH:TIA, hypogonadism, hypothyroidism. Referring Practitioner: Bari Johnson MD  Referral Date : 10/03/22  Diagnosis: Subarachnoid Hemorrhage  General Comment  Comments: Talked to RN, cleared by her and neuro for PT/OT evaluations  Subjective  Subjective: Pt supine in bed. Agreeable to therapy with guest in the room.          Social/Functional History  Social/Functional History  Lives With: Spouse (3 children (13, 6, 9))  Type of Home: House  Home Layout: Multi-level  Home Access: Stairs to enter with rails  Entrance Stairs - Number of Steps: 2  Bathroom Shower/Tub: Tub/Shower unit  Bathroom Toilet: Standard  Bathroom Equipment: Grab bars in shower  Home Equipment: Kreg Shells  Has the patient had two or more falls in the past year or any fall with injury in the past year?: Yes  ADL Assistance: [facility] [facility] [address]: Independent  Homemaking Responsibilities: Yes  Ambulation Assistance: Independent  Transfer Assistance: Independent  Active : Yes  Occupation: Full time employment  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  Vision/Hearing  Vision  Vision: Impaired  Vision Exceptions: Wears glasses at all times  Hearing  Hearing: Within functional limits    Cognition   Orientation  Overall Orientation Status: Within Normal Limits  Orientation Level: Oriented X4     Objective   Heart Rate: 54  Heart Rate Source: Monitor  BP: 133/85  BP Location: Right upper arm  BP Method: Automatic  Patient Position: Semi fowlers  MAP (Calculated): 101  SpO2: 100 %  O2 Device: None (Room air)        Gross Assessment  AROM: Within functional limits  Strength:  Within functional limits                 Bed Mobility Training  Bed Mobility Training: Yes  Overall Level of Assistance: Modified independent  Supine to Sit: Modified independent  Balance  Sitting: Intact  Standing: Intact  Transfer Training  Transfer Training: Yes  Overall Level of Assistance: Modified independent  Sit to Stand: Modified independent  Stand to Sit: Modified independent  Toilet Transfer: Modified independent  Gait  Overall Level of Assistance: Modified independent (completed room mobility)  Wheelchair Management  Wheelchair Management: No  Bed mobility  Supine to Sit: Modified independent  Scooting: Modified independent  Transfers  Sit to Stand: Modified independent  Stand to Sit: Modified independent  Bed to Chair: Modified independent  Ambulation  Surface: Level tile  Device: No Device  Assistance: Modified Independent  Quality of Gait: Steady  Gait Deviations: None  Distance: 8ft to and from restroom     Balance  Sitting - Static: Good  Sitting - Dynamic: Good  Standing - Static: Good  Standing - Dynamic: Fair;+           OutComes Score                                                  AM-PAC Score  AM-PAC Inpatient Mobility Raw Score : 22 (10/04/22 1202)  AM-PAC Inpatient T-Scale Score : 53.28 (10/04/22 1202)  Mobility Inpatient CMS 0-100% Score: 20.91 (10/04/22 1202)  Mobility Inpatient CMS G-Code Modifier : Emma Ritter (10/04/22 1202) Tinneti Score       Goals  Short Term Goals  Time Frame for Short Term Goals: discharge  Short Term Goal 1: Pt will tolerate an ambulation assessment  Short Term Goal 2: Pt will tolerate a stair assessment  Patient Goals   Patient Goals :  To return home       Education  Patient Education  Education Given To: Patient  Education Provided: Role of Therapy  Education Method: Demonstration  Barriers to Learning: None  Education Outcome: Verbalized understanding      Therapy Time   Individual Concurrent Group Co-treatment   Time In 1120         Time Out 1149         Minutes 29          Timed Code Treatment Minutes:   14    Total Treatment Minutes:  Cornel Suarez [facility] , [place], [place] #[id number]

## 2022-10-04 NOTE — PROGRESS NOTES
Occupational Therapy  Facility/Department: [facility] ICU   Occupational Therapy Initial Assessment and Tx    Name: Aicha Rojas  : 1975  MRN: [id number]  Date of Service: 10/4/2022    Discharge Recommendations: Aicha Rojas scored a 22/24 on the AM-PAC ADL Inpatient form. Current research shows that an AM-PAC score of 18 or greater is typically associated with a discharge to the patient's home setting. Based on the patient's AM-PAC score, and their current ADL deficits, it is recommended that the patient have 2-3 sessions per week of Occupational Therapy at d/c to increase the patient's independence. At this time, this patient demonstrates the endurance and safety to discharge home with home services and a follow up treatment frequency of 2-3x/wk. Please see assessment section for further patient specific details. If patient discharges prior to next session this note will serve as a discharge summary. Please see below for the latest assessment towards goals. Patient Diagnosis(es): There were no encounter diagnoses. Past Medical History:  has no past medical history on file. Past Surgical History:  has no past surgical history on file. Treatment Diagnosis:  safety awareness      Assessment   Performance deficits / Impairments: Decreased safe awareness  Assessment: Pt from home w/ wife and 3 children, works full time. Pt admitted for subarachnoid hemorrhage. Pt currently requiring SPVN to [place] for functional mobility/transfers and ADL's. Pt completed room mobility only declining additional ADLs and mobility 2/2 headache. OT recommending home w/ initial 24h Supervision. OT plans to see pt for 1 treatment to address higher level ADLs. Cont. w/ POC.   Treatment Diagnosis:  safety awareness  REQUIRES OT FOLLOW-UP: Yes  Activity Tolerance  Activity Tolerance: Patient Tolerated treatment well        Plan   Occupational Therapy Plan  Times Per Week: see 1 more time treatment then s/o  Times Per Day: Once a day     Restrictions  Position Activity Restriction  Other position/activity restrictions: HOB elevated to 30 degrees    Subjective   General  Chart Reviewed: Yes  Patient assessed for rehabilitation services?: Yes (Simultaneous filing. User may not have seen previous data.)  Additional Pertinent Hx: pmhx of TIA, hypogonadism, hypothyroidism  Referring Practitioner: Marliia Hua DO  Diagnosis: subarachnoid hemorrhage  Subjective  Subjective: Pt seated in bed, agreeable to therapy evaluation. c/o headache     Social/Functional History  Social/Functional History  Lives With: Spouse (3 children (13, 6, 9))  Type of Home: House  Home Layout: Multi-level  Home Access: Stairs to enter with rails  Entrance Stairs - Number of Steps: 2  Bathroom Shower/Tub: Tub/Shower unit  Bathroom Toilet: Standard  Bathroom Equipment: Grab bars in shower  Home Equipment: Zahra Sauce  Has the patient had two or more falls in the past year or any fall with injury in the past year?: Yes  ADL Assistance: [address] [place] [address] Avenue: Independent  Homemaking Responsibilities: Yes  Ambulation Assistance: Independent  Transfer Assistance: Independent  Active : Yes  Occupation: Full time employment  Type of Occupation:  for [employer] and school               Safety Devices  Type of Devices: All fall risk precautions in place; Chair alarm in place; Left in chair;Nurse notified;Call light within reach (Simultaneous filing.  User may not have seen previous data.)  Restraints  Restraints Initially in Place: No  Bed Mobility Training  Bed Mobility Training: Yes  Overall Level of Assistance: Modified independent  Supine to Sit: Modified independent  Balance  Sitting: Intact  Standing: Intact  Transfer Training  Transfer Training: Yes  Overall Level of Assistance: SPVN to Modified independent  Sit to Stand: SPVN to Modified independent  Stand to Sit: SPVN to Modified independent  Toilet Transfer: Modified independent  Gait  Overall Level of Assistance: Modified independent (completed room mobility)  Wheelchair Management  Wheelchair Management: No     AROM: Within functional limits  Strength: Within functional limits  ADL  Feeding: Modified independent   Feeding Skilled Clinical Factors: pt able to open containers  Grooming: Modified independent   Grooming Skilled Clinical Factors: brushed teeth in stance at sink  Additional Comments: declined additional ADL's     Activity Tolerance  Activity Tolerance: Patient tolerated treatment well        Vision  Vision: Impaired  Vision Exceptions: Wears glasses at all times  Hearing  Hearing: Within functional limits  Cognition  Overall Cognitive Status: WNL  Orientation  Overall Orientation Status: Within Normal Limits  Orientation Level: Oriented X4                  Education Given To: Patient  Education Provided: Role of Therapy;IADL Safety;Plan of Care  Education Method: Verbal;Demonstration  Barriers to Learning: None  Education Outcome: Verbalized understanding          AM-PAC Score        AM-PAC Inpatient Daily Activity Raw Score: 22 (10/04/22 1206)  AM-PAC Inpatient ADL T-Scale Score : 47.1 (10/04/22 1206)  ADL Inpatient CMS 0-100% Score: 25.8 (10/04/22 1206)  ADL Inpatient CMS G-Code Modifier : CJ (10/04/22 1206)        Goals  Short Term Goals  Time Frame for Short Term Goals: d/c  Short Term Goal 1: Independent for functional transfers  Short Term Goal 2: Independent for functional mobility  Short Term Goal 3: Independent for LB/UB bathing and dressing       Therapy Time   Individual Concurrent Group Co-treatment   Time In 1120         Time Out 1149         Minutes 29          Timed Code Treatment Minutes:   14    Total Treatment Minutes:  [address], OTS    Therapist was present, directed the patients care, made skilled judgement and was responsible for assessment and treatment of the patient.      ALINA Cowan, OTR/L

## 2022-10-04 NOTE — H&P
ICU HISTORY AND [facility] Day:   ICU Day:                                                          Code:Full Code  Admit Date: 10/3/2022  PCP: No primary care provider on file. CC: headache    HISTORY OF PRESENT ILLNESS:   Srinivasa Bernal is a 56 y.o. male with pmhx of TIA, hypogonadism, hypothyroidism who presented to [facility] for a 1 week hx of headache with associated vision changes and nausea. Patient states that he was seated at work when all of a sudden he felt a pop behind his left eye and subsequently started having a headache in that same spot. He denies any trauma to his head prior to this onset. He has subsequently had a headache that has expanded to include left temporal area and left jaw. He describes it as intense pressure that was worst earlier this week but has subsided somewhat in intensity. He additionally notes some blurring of vision in the left eye. It is mildly better now with his improvement in pain. He denies any dizziness or loss of balance. He does endorse some mild pressure in his left ear with mild ringing as well. He is additionally noted nausea with 1 episode of vomiting productive of green output. He continues with intense nausea today. Patient has had a TIA in 2020 and at that time was worked up with EKG, echo, MRI all of which were normal.  Patient was started on statin and aspirin at that time. With current symptoms patient thought it might be a similar problem and began taking aspirin 81 from onset to 2 days ago for headache relief. Patient denies any focal weakness or change in sensation and is able to ambulate normally. At [facility]: Patient was worked up with head CT which did show acute left extra-axial hemorrhage with left small frontotemporal subdural hematoma and left temporal subarachnoid hemorrhage. There was no midline shift. Repeat head CT 6 hours later was stable.     Neurosurgery was consulted and decision was made to transfer to [facility] for neuro monitoring and treatment. PAST HISTORY:   No past medical history on file. History of TIA  No past surgical history on file. Surgical history reviewed, and patient negative for recent surgeries. SocialHistory:   The patient lives at home    Alcohol: 3 drinks a week  Illicit drugs: no use  Tobacco:  no use    Family History:  No family history on file. MEDICATIONS:     No current facility-administered medications on file prior to encounter. Current Outpatient Medications on File Prior to Encounter   Medication Sig Dispense Refill    aspirin 81 MG EC tablet Take 1 tablet by mouth daily 30 tablet 3    atorvastatin (LIPITOR) 40 MG tablet Take 1 tablet by mouth nightly 30 tablet 1         Scheduled Meds:   atorvastatin  40 mg Oral Nightly    levETIRAcetam  500 mg IntraVENous Q12H      Continuous Infusions:   sodium chloride 100 mL/hr at 10/03/22 2217     PRN Meds:acetaminophen, ondansetron **OR** ondansetron, meclizine, promethazine    Allergies: No Known Allergies    REVIEW OF SYSTEMS:       History obtained from chart review and the patient    Review of Systems   Constitutional:  Negative for activity change and fever. HENT:  Negative for congestion. Respiratory:  Negative for cough, chest tightness and shortness of breath. Cardiovascular:  Negative for chest pain, palpitations and leg swelling. Gastrointestinal:  Positive for nausea and vomiting. Negative for abdominal distention, abdominal pain, constipation and diarrhea. Genitourinary:  Negative for difficulty urinating and dysuria. Neurological:  Positive for headaches. Negative for dizziness, facial asymmetry, weakness and numbness.      PHYSICAL EXAM:       Vitals: /86   Pulse 55   Temp 98.3 °F (36.8 °C) (Oral)   SpO2 94%     I/O:    Intake/Output Summary (Last 24 hours) at 10/3/2022 2231  Last data filed at 10/3/2022 2217  Gross per 24 hour   Intake 269.18 ml   Output --   Net 269.18 ml     I/O this shift:  In: 269.2 [P.O.:150; I.V.:119.2]  Out: -   No intake/output data recorded. Physical Examination:     Physical Exam  Constitutional:       Appearance: Normal appearance. He is obese. HENT:      Head: Normocephalic and atraumatic. Eyes:      Extraocular Movements: Extraocular movements intact. Conjunctiva/sclera: Conjunctivae normal.      Pupils: Pupils are equal, round, and reactive to light. Cardiovascular:      Rate and Rhythm: Normal rate and regular rhythm. Pulses: Normal pulses. Heart sounds: Normal heart sounds. Pulmonary:      Effort: Pulmonary effort is normal.      Breath sounds: Normal breath sounds. Abdominal:      General: There is no distension. Palpations: Abdomen is soft. There is no mass. Tenderness: There is no abdominal tenderness. Musculoskeletal:      Cervical back: Normal range of motion and neck supple. Right lower leg: No edema. Left lower leg: No edema. Skin:     General: Skin is warm and dry. Neurological:      General: No focal deficit present. Mental Status: He is alert and oriented to person, place, and time. Cranial Nerves: No cranial nerve deficit. Sensory: No sensory deficit. Motor: No weakness. Gait: Gait normal.   Psychiatric:      Comments: Mildly anxious         Access:                         -Peripheral Access Day#:1    No results for input(s): PHART, IHY8IOZ, PO2ART in the last 72 hours. DATA:       Labs:  CBC:   Recent Labs     10/03/22  0721   WBC 9.6   HGB 15.8   HCT 46.0          BMP:   Recent Labs     10/03/22  0721      K 4.5      CO2 26   BUN 16   CREATININE 0.9   GLUCOSE 112*     LFT's:   Recent Labs     10/03/22  0721   AST 27   ALT 47*   BILITOT 0.7   ALKPHOS 113     Troponin: No results for input(s): TROPONINI in the last 72 hours. BNP:No results for input(s): BNP in the last 72 hours.   ABGs: No results for input(s): PHART, GUE9XOT, PO2ART in the last 72 hours. INR:   Recent Labs     10/03/22  0914   INR 1.06       U/A:No results for input(s): NITRITE, COLORU, PHUR, LABCAST, WBCUA, RBCUA, MUCUS, TRICHOMONAS, YEAST, BACTERIA, CLARITYU, SPECGRAV, LEUKOCYTESUR, UROBILINOGEN, BILIRUBINUR, BLOODU, GLUCOSEU, AMORPHOUS in the last 72 hours. Invalid input(s): Ana Query    MRI BRAIN W WO CONTRAST    (Results Pending)   MRV HEAD W WO CONTRAST    (Results Pending)   MRA HEAD WO CONTRAST    (Results Pending)     Echo: 2/13/2020 EF 55-60%, mild MR otherwise normal      ASSESSMENT AND PLAN:   Lore Hernandez is a 56 y.o. male, who was transferred from   [facility] for neuro monitoring and management of newly diagnosed subdural and subarachnoid hemorrhage. Spontaneous acute SDH  Spontaneous acute SAH  Patient with 1 week hx of left sided headache that started with a pop behind his left eye. He denies any head injury. It subsequently worsened and has felt pressure behind his eye, in his jaw, and around the left side of his head. He has also had associated vision changes, and nausea but no dizziness or vertigo. Currently patient with improved headache. On head CT: Acute left extra-axial hemorrhage. There is a small left frontotemporal subdural hematoma. Left temporal subarachnoid hemorrhage component is also present. No midline shift  - Start keppra, 1g load then 500mg BID  - Repeat CT head 6hrs from initial is stable  - MRI brain w + w/o, MRA, MRV to look for causes of hemorrhage   - Maintain SBP <160, prn meds in but will consider nicardipine drip if does not control.  - Neurosurg consult  - q1h neuro checks  - patient last took aspirin for headache 2 days ago but had used for headache relief since onset. - nausea control    Hx of TIA 2/13/2020  Patient didn't know what was going on with headache and with hx of TIA he decided to take 81mg aspirin every day for past week but stopped on Sat (2 days prior to arrival).   Patient hospitalized for TIA (left sided weakbess) in 2020. MRI, EKG, Echo normal at that time. Patient discharged on statin and aspirin. - cont statin, hold aspirin    Hypothyroidism  Patient not on any medication at present time. Has been on synthroid in the past but stopped around 6 weeks ago due to lack of improvement in symptoms. Was due to follow up with PCP for repeat labs. - check tsh w reflex    Hx of Hypogonadism  Patient not on any medication at present time. Was on testosterone replacement but limited benefit and labs ended up supra therapeutic. Code Status:Full Code  FEN: NPO  PPX:    DISPO: ICU    This patient has been staffed and discussed with Clair Andino  -----------------------------  Sharan Bartholomew MD, PGY-1  10/3/2022  10:31 PM  I saw the patient independently from the resident . I discussed the care with the resident. I personally reviewed the HPI, PH, FH, SH, ROS and medications. I repeated pertinent portions of the examination and reviewed the relevant imaging and laboratory data. I agree with the findings, assessment and plan as documented.  addition to: Plan as above

## 2022-10-04 NOTE — PROGRESS NOTES
Pt admitted into room 4508. A+O x4. ICU residents at bedside. All safety precautions in place per unit protocol. Will continue to monitor neuro status q 1 hr.

## 2022-10-04 NOTE — CONSULTS
NEUROSURGERY CONSULT NOTE       Orlando Gonzalez MD is requesting this consult. Reason for Consult: [facility] /SDH  Admission Chief Complaint: progressively worsening headache    History of Present Illness     Isaac Pena is a 56 y.o. y/o male with no significant PMH who presents with progressively worsening headaches. He states headaches started several days ago after hearing a pop behind L eye, immediately he had a warm sensation over left side of face and blurry vision in the left eye, both symptoms resolved quickly and have not returned. Has since had a headache that started mild and consistently worsened each day, tried to take ASA for headache relief, but no relief obtained. He also started to get a more sharp stabbing pain to left side of face. He denies any weakness, dizziness, confusion. Denies having hx of headaches like this previously. REVIEW OF SYSTEMS:   Constitutional- No weight loss or fevers   Eyes- No diplopia. No photophobia. Ears/nose/throat- No dysphagia. No Dysarthria   Cardiovascular- No palpitations. No chest pain   Respiratory- No dyspnea. No Cough   Gastrointestinal- No Abdominal pain. No Vomiting. Genitourinary- No incontinence. No urinary retention   Musculoskeletal- No myalgia. No arthralgia   Skin- No rash. No easy bruising. Psychiatric- No depression. No anxiety   Endocrine- No diabetes. No thyroid issues. Hematologic- No bleeding difficulty. No fatigue   Neurologic- headache    Past Medical, Surgical, Family, and Social History   PAST MEDICAL HISTORY:  No past medical history on file. SURGICAL HISTORY:  No past surgical history on file. FAMILY HISTORY & SOCIAL HISTORY:  Family history non-contributory  No family history on file.   Social History     Tobacco Use    Smoking status: Never    Smokeless tobacco: Never   Substance Use Topics    Alcohol use: Not Currently    Drug use: Not Currently          Allergies & Outpatient Medications   ALLERGIES:  No Known Allergies  HOME MEDICATIONS:  Current Discharge Medication List        CONTINUE these medications which have NOT CHANGED    Details   aspirin 81 MG EC tablet Take 1 tablet by mouth daily  Qty: 30 tablet, Refills: 3      atorvastatin (LIPITOR) 40 MG tablet Take 1 tablet by mouth nightly  Qty: 30 tablet, Refills: 1               Physical Exam   PHYSICAL EXAM:  BP (!) 156/100   Pulse 56   Temp 98 °F (36.7 °C) (Oral)   Resp 12   Ht 6' (1.829 m)   Wt 266 lb 15.6 oz (121.1 kg)   SpO2 97%   BMI 36.21 kg/m²     General Alert, no distress, well-nourished    Neurologic  Mental status:   Orientation to person, place, time, situation  Attention intact as able to attend well to the exam    Language fluent in conversation  Comprehension intact; follows simple commands    Cranial nerves:   Pupils equal   Gaze conjugate  EOM's intact  Face symmetric    Motor Exam:   R  L    Deltoid 5 5   Biceps 5 5   Triceps 5 5   Wrist extension  5 5   Interossei 5 5      R  L    Hip flexion  5 5   Hip extension  5 5   Knee flexion  5 5   Knee extension  5 5   Ankle dorsiflexion  5 5   Ankle plantar flexion  5 5     Sensory: light touch intact and symmetric in all 4 extremities    Tone: normal in all 4 extremities    Cardiovascular: Warm, appears well perfused   Respiratory: Easy, non-labored respiratory pattern   Abdominal: Abdomen is without distention   Extremities: Upper and lower extremities are atraumatic in appearance without deformity. Diagnostic Results   IMAGES:  Images personally reviewed and agree w/ radiology interpretation. MRI /MRA/MRV  Impression   1. Thin but fairly diffuse left convexity and tentorial subdural hematoma measuring up to 3 mm in thickness unchanged since previous noncontrast head CT. MRA BRAIN:       FINDINGS: The internal carotid arteries are patent and normal in caliber through the skull base. The middle cerebral and anterior cerebral arteries are patent and normal in caliber.  The anterior communicating artery is normal. Posterior cerebral arteries    are patent bilaterally. The basilar artery is patent and normal in caliber. Distal vertebral arteries are patent and normal in caliber. IMPRESSION:   1. Normal MRA of the brain. MRV brain:       FINDINGS: The internal jugular veins, sigmoid sinuses, transverse sinuses, straight sinus, and superior sagittal sinus are widely patent and normal in appearance. IMPRESSION:   1. Normal MRV of the brain. LABS:  All results below personally reviewed. Pertinent positives & negatives are addressed in Impression & Recommendations below. LABS   Metabolic Panel Recent Labs     10/03/22  0721 10/04/22  0337    135*   K 4.5 4.8    100   CO2 26 22   BUN 16 15   CREATININE 0.9 0.8*   GLUCOSE 112* 104*   CALCIUM 9.5 8.9   LABALBU 4.9  --    ALKPHOS 113  --    ALT 47*  --    AST 27  --       CBC / Coags Recent Labs     10/03/22  0721 10/03/22  0914 10/04/22  0312   WBC 9.6  --  11.4*   RBC 5.02  --  4.53   HGB 15.8  --  14.4   HCT 46.0  --  41.5     --  200   INR  --  1.06  --       Other Lab Results   Component Value Date/Time    LABA1C 4.9 02/14/2020 07:40 AM    LDLCALC 88 02/13/2020 01:01 PM    TRIG 80 02/13/2020 01:01 PM     No results found for: PHENYTOIN, KEPPRA, LACOSA, LAMO, VALPROATE, LACTSEPSIS, LACTA       CURRENT SCHEDULED MEDICATIONS   Inpatient Medications     atorvastatin, 40 mg, Oral, Nightly    levETIRAcetam, 500 mg, IntraVENous, Q12H   Infusions      Antibiotics   Recent Abx Admin        No antibiotic orders with administrations found. IMPRESSION & RECOMMENDATIONS     IMPRESSION:  Patient is a 55 y/o M w/ spontaneous SAH and SDH, concerning for possible vascular etiology. Patient denies any trauma. CTA / MRI/MRA negative.  Patient will likely need cerebral angiogram.     RECOMMENDATIONS:  -Diagnostic cerebral angiogram likely Wednesday or Thursday   Neurologic exams frequency:  -Q4H  -Patient should only go to 5T w/ PCU level of care d/t need for frequent monitoring and strict BP control   -For change in exam MUST contact [place] team   -Please check urine drug screen  -Check ESR/CRP  -Keppra 500mg BID  -Maintain SBP <160; If PRN med insufficient after 30 minutes, start Nicardipine infusion  -Keep Plt >100k & INR <1.4  -Hold all anticoagulation & antiplatelet for 2 weeks (ok to give sq heparin for dvt prophylaxis)  -NCC following as well.    -We will follow        MISSY Doe - IZABELLA     10/4/2022 11:23 AM

## 2022-10-04 NOTE — CARE COORDINATION
Mr. Simona Fox is from home with his spouse. He is independent with self care and functional mobility, an active , and employed. CM will assist with service needs. PT/OT evaluations and recommendations have been ordered.     Electronically signed by ARACELIS Greenberg RN-[facility]  Case Management  [phone]

## 2022-10-04 NOTE — CONSULTS
Critical care consult    Hospital Day:   ICU Day:                                                          Code:Full Code  Admit Date: 10/3/2022  PCP: No primary care provider on file. CC: Sudden headache    HISTORY OF PRESENT ILLNESS:     Interval History  - Patient awake, alert and in no acute distress  - Reports left sided headache with continued ear fullness and ringing  - Would like better pain control, but explained to pt importance of not confounding neuro exams with sedative pain meds    HPI    Adriana Sánchez is a 56 y.o. male with PMH of TIA, hypogonadism, hypothyroidism who presented to [facility] for 1 week of headache with associated vision changes and nausea. Patient states that he was seated at work when all of a sudden he felt a pop behind his left eye and followed by left retro-orbital headache. The headache expanded to left temporal area and left jaw. He describes it as intense pressure worst earlier this week but somewhat improving. He denies head trauma. He additionally notes left eye blurred vision, now mildly better with improvement in pain. He denies dizziness or loss of balance. He endorses mild pressure in his left ear with mild ringing. He is additionally noted nausea with 1 episode of green vomitus. He continues with intense nausea today. Patient denies any focal weakness or change in sensation and is able to ambulate normally. Patient has had a TIA in 2020 and at that time was worked up with EKG, Echo, MRI all of which were normal.  Patient was started on statin and aspirin at that time. With current symptoms patient thought it might be a similar problem and began taking aspirin 81 from onset to 2 days ago for headache relief. At [facility]: Head CT showed acute left extra-axial hemorrhage with small left frontotemporal subdural hematoma and left temporal subarachnoid hemorrhage. There was no midline shift.   Repeat head CT 6 hours later was stable. Neurosurgery was consulted and decision was made to transfer to [facility] for neuro monitoring and treatment. PAST HISTORY:   No past medical history on file. No past surgical history on file. SocialHistory:   The patient lives at    Alcohol:  Illicit drugs: no use  Tobacco:      Family History:  No family history on file. MEDICATIONS:     No current facility-administered medications on file prior to encounter. Current Outpatient Medications on File Prior to Encounter   Medication Sig Dispense Refill    aspirin 81 MG EC tablet Take 1 tablet by mouth daily 30 tablet 3    atorvastatin (LIPITOR) 40 MG tablet Take 1 tablet by mouth nightly (Patient not taking: Reported on 10/3/2022) 30 tablet 1     Scheduled Meds:   atorvastatin  40 mg Oral Nightly    levETIRAcetam  500 mg IntraVENous Q12H      Continuous Infusions:  PRN Meds:acetaminophen, ondansetron **OR** ondansetron, meclizine, promethazine    Allergies: No Known Allergies    REVIEW OF SYSTEMS:       History obtained from the patient    Review of Systems    PHYSICAL EXAM:       Vitals: /75   Pulse 50   Temp 98 °F (36.7 °C) (Oral)   Resp 12   Ht 6' (1.829 m)   Wt 266 lb 15.6 oz (121.1 kg)   SpO2 97%   BMI 36.21 kg/m²     I/O:    Intake/Output Summary (Last 24 hours) at 10/4/2022 0758  Last data filed at 10/4/2022 0545  Gross per 24 hour   Intake 947.56 ml   Output 750 ml   Net 197.56 ml     No intake/output data recorded. I/O last 3 completed shifts: In: 947.6 [P.O.:150; I.V.:703.4; IV Piggyback:94.2]  Out: 750 [Urine:750]    Physical Exam  Constitutional:       Appearance: Normal appearance. HENT:      Head: Normocephalic and atraumatic. Eyes:      Extraocular Movements: Extraocular movements intact. Pupils: Pupils are equal, round, and reactive to light. Cardiovascular:      Rate and Rhythm: Normal rate and regular rhythm. Heart sounds: Normal heart sounds. No murmur heard.     No friction rub. No gallop. Pulmonary:      Effort: Pulmonary effort is normal.      Breath sounds: Normal breath sounds. Musculoskeletal:      Cervical back: Normal range of motion. Neurological:      Mental Status: He is alert and oriented to person, place, and time. Cranial Nerves: Cranial nerve deficit present. Sensory: No sensory deficit. Motor: No weakness. Comments: Diminished hearing in L ear. CN exam otherwise unremarkable. Psychiatric:         Mood and Affect: Mood normal.         Behavior: Behavior normal.         Thought Content: Thought content normal.         Judgment: Judgment normal.     Access:   -Central Access Day #: n/a                                  -Peripheral Access Day#:1  -Arterial line Day#: n/a                                  Faust Day#: n/a  NGT Day#: n/a                                        ETT Day#:n/a  Vent Settings: n/a    No results for input(s): PHART, YJE3SZH, PO2ART in the last 72 hours. DATA:       Labs:  CBC:   Recent Labs     10/03/22  0721 10/04/22  0312   WBC 9.6 11.4*   HGB 15.8 14.4   HCT 46.0 41.5    200       BMP:   Recent Labs     10/03/22  0721 10/04/22  0337    135*   K 4.5 4.8    100   CO2 26 22   BUN 16 15   CREATININE 0.9 0.8*   GLUCOSE 112* 104*     LFT's:   Recent Labs     10/03/22  0721   AST 27   ALT 47*   BILITOT 0.7   ALKPHOS 113     Troponin: No results for input(s): TROPONINI in the last 72 hours. BNP:No results for input(s): BNP in the last 72 hours. ABGs: No results for input(s): PHART, CCU7YXZ, PO2ART in the last 72 hours. INR:   Recent Labs     10/03/22  0914   INR 1.06     U/A:No results for input(s): NITRITE, COLORU, PHUR, LABCAST, WBCUA, RBCUA, MUCUS, TRICHOMONAS, YEAST, BACTERIA, CLARITYU, SPECGRAV, LEUKOCYTESUR, UROBILINOGEN, BILIRUBINUR, BLOODU, GLUCOSEU, AMORPHOUS in the last 72 hours. Invalid input(s): KETONESU    MRI BRAIN W WO CONTRAST   Final Result   1.  Thin but fairly diffuse left convexity and tentorial subdural hematoma measuring up to 3 mm in thickness unchanged since previous noncontrast head CT. MRA BRAIN:      FINDINGS: The internal carotid arteries are patent and normal in caliber through the skull base. The middle cerebral and anterior cerebral arteries are patent and normal in caliber. The anterior communicating artery is normal. Posterior cerebral arteries    are patent bilaterally. The basilar artery is patent and normal in caliber. Distal vertebral arteries are patent and normal in caliber. IMPRESSION:   1. Normal MRA of the brain. MRV brain:      FINDINGS: The internal jugular veins, sigmoid sinuses, transverse sinuses, straight sinus, and superior sagittal sinus are widely patent and normal in appearance. IMPRESSION:   1. Normal MRV of the brain. MRV HEAD W WO CONTRAST   Final Result   1. Thin but fairly diffuse left convexity and tentorial subdural hematoma measuring up to 3 mm in thickness unchanged since previous noncontrast head CT. MRA BRAIN:      FINDINGS: The internal carotid arteries are patent and normal in caliber through the skull base. The middle cerebral and anterior cerebral arteries are patent and normal in caliber. The anterior communicating artery is normal. Posterior cerebral arteries    are patent bilaterally. The basilar artery is patent and normal in caliber. Distal vertebral arteries are patent and normal in caliber. IMPRESSION:   1. Normal MRA of the brain. MRV brain:      FINDINGS: The internal jugular veins, sigmoid sinuses, transverse sinuses, straight sinus, and superior sagittal sinus are widely patent and normal in appearance. IMPRESSION:   1. Normal MRV of the brain. MRA HEAD WO CONTRAST   Final Result   1. Thin but fairly diffuse left convexity and tentorial subdural hematoma measuring up to 3 mm in thickness unchanged since previous noncontrast head CT.       MRA BRAIN:      FINDINGS: The internal carotid arteries are patent and normal in caliber through the skull base. The middle cerebral and anterior cerebral arteries are patent and normal in caliber. The anterior communicating artery is normal. Posterior cerebral arteries    are patent bilaterally. The basilar artery is patent and normal in caliber. Distal vertebral arteries are patent and normal in caliber. IMPRESSION:   1. Normal MRA of the brain. MRV brain:      FINDINGS: The internal jugular veins, sigmoid sinuses, transverse sinuses, straight sinus, and superior sagittal sinus are widely patent and normal in appearance. IMPRESSION:   1. Normal MRV of the brain. EKG:   Echo:  Micro:     ASSESSMENT AND PLAN:   Crispin Doran is a 56 y.o. male, who was transferred from [address] [place] [address] for neuro monitoring and management of acute subdural and subarachnoid hemorrhage. Spontaneous acute SDH  Spontaneous acute SAH  Sudden severe headache with optic and vestibulocochlear cranial nerve disturbances and no meningeal signs. Subsequently worsened, retro-orbital pain/pressure, jaw pain, nausea. Currently improved headache. Head CT: Acute left extra-axial hemorrhage. Small left frontotemporal SDH. Left temporal SAH component. No midline shift.  - Start keppra, 1g load then 500mg BID  - Repeat CT head 6hrs from initial is stable  - MRI brain w + w/o, MRA, MRV to look for causes of hemorrhage   - Maintain SBP <160, prn meds in but will consider nicardipine drip if does not control.  - Neurosurg consult  - q1h neuro checks  - patient last took aspirin for headache 2 days ago but had used for headache relief since onset. - Nausea control with phenergan  - 10/4: Normal MRA, MRV. MRI: Thin but fairly diffuse left convexity and tentorial subdural hematoma measuring up to 3 mm in thickness unchanged since previous noncontrast head CT. Continue Sz Ppx, Q1 neuro checks. Await NSGY recs.      Hx of TIA 2/13/2020  Patient didn't know what was going on with headache and with hx of TIA he decided to take 81mg aspirin every day for past week but stopped on Sat (2 days prior to arrival). Patient hospitalized for TIA (left sided weakbess) in 2020. MRI, EKG, Echo normal at that time. Patient discharged on statin and aspirin.  - Cont statin, hold aspirin     Chronic Conditions    Hypothyroidism  Patient not on any medication at present time. Has been on synthroid in the past but stopped around 6 weeks ago due to lack of improvement in symptoms. Was due to follow up with PCP for repeat labs. - Stable problem     Hx of Hypogonadism  Patient not on any medication at present time. Was on testosterone replacement but limited benefit and labs ended up supra therapeutic. Code Status: Full Code  FEN: NPO  PPX:    DISPO: ICU    This patient has been staffed and discussed with Dr. Percy Fernando. -----------------------------  Luis Daniel Fan MD, PGY-1  10/4/2022  7:58 AM    Pulmonary & Critical Care    Patient seen and examined. I agree with Dr. Tomy Briceno history, physical, lab findings, assessment and plan. Patient transferred from [facility] for further evaluation of small SDH and SAH.   MRA/MRV as well as CTA normal  Patient alert and orient x4 without any focal deficits  Blood pressure is 133/85  He was taking a baby aspirin prior to presentation to hospital  Neurosurgery planning on cerebral angiogram tomorrow  Neurochecks stretched to every 4 hrs  No history of tobacco  Will check urine tox  Ok to TXF to 5T PCU    Percy Fernando MD

## 2022-10-04 NOTE — PROGRESS NOTES
4 Eyes Admission Assessment     I agree as the admission nurse that 2 RN's have performed a thorough Head to Toe Skin Assessment on the patient. ALL assessment sites listed below have been assessed on admission. Areas assessed by both nurses: Yes  [x]   Head, Face, and Ears   [x]   Shoulders, Back, and Chest  [x]   Arms, Elbows, and Hands   [x]   Coccyx, Sacrum, and Ischium  [x]   Legs, Feet, and Heels        Does the Patient have Skin Breakdown?   No         Chao Prevention initiated:  Yes   Wound Care Orders initiated:  No      [facility] nurse consulted for Pressure Injury (Stage 3,4, Unstageable, DTI, NWPT, and Complex wounds) or Chao score 18 or lower:  No      Nurse 1 eSignature: Electronically signed by Acosta Canchola RN on 10/3/22 at 10:24 PM EDT    **SHARE this note so that the co-signing nurse is able to place an eSignature**    Nurse 2 eSignature: Electronically signed by Yamileth Garcia RN on 10/3/22 at 10:27 PM EDT

## 2022-10-05 LAB
A/G RATIO: 2 (ref 1.1–2.2)
ALBUMIN SERPL-MCNC: 4.5 G/DL (ref 3.4–5)
ALP BLD-CCNC: 110 U/L (ref 40–129)
ALT SERPL-CCNC: 36 U/L (ref 10–40)
ANION GAP SERPL CALCULATED.3IONS-SCNC: 10 MMOL/L (ref 3–16)
AST SERPL-CCNC: 21 U/L (ref 15–37)
BILIRUB SERPL-MCNC: 0.7 MG/DL (ref 0–1)
BUN BLDV-MCNC: 11 MG/DL (ref 7–20)
C-REACTIVE PROTEIN: 9.4 MG/L (ref 0–5.1)
CALCIUM SERPL-MCNC: 9.2 MG/DL (ref 8.3–10.6)
CHLORIDE BLD-SCNC: 102 MMOL/L (ref 99–110)
CHOLESTEROL, TOTAL: 151 MG/DL (ref 0–199)
CO2: 25 MMOL/L (ref 21–32)
CREAT SERPL-MCNC: 0.9 MG/DL (ref 0.9–1.3)
GFR AFRICAN AMERICAN: >60
GFR NON-AFRICAN AMERICAN: >60
GLUCOSE BLD-MCNC: 102 MG/DL (ref 70–99)
HCT VFR BLD CALC: 43.2 % (ref 40.5–52.5)
HDLC SERPL-MCNC: 39 MG/DL (ref 40–60)
HEMOGLOBIN: 15.3 G/DL (ref 13.5–17.5)
LDL CHOLESTEROL CALCULATED: 94 MG/DL
MAGNESIUM: 2.5 MG/DL (ref 1.8–2.4)
MCH RBC QN AUTO: 31.9 PG (ref 26–34)
MCHC RBC AUTO-ENTMCNC: 35.4 G/DL (ref 31–36)
MCV RBC AUTO: 90.2 FL (ref 80–100)
PDW BLD-RTO: 12.9 % (ref 12.4–15.4)
PHOSPHORUS: 2.7 MG/DL (ref 2.5–4.9)
PLATELET # BLD: 208 K/UL (ref 135–450)
PMV BLD AUTO: 8.9 FL (ref 5–10.5)
POTASSIUM SERPL-SCNC: 4.7 MMOL/L (ref 3.5–5.1)
RBC # BLD: 4.79 M/UL (ref 4.2–5.9)
SODIUM BLD-SCNC: 137 MMOL/L (ref 136–145)
TOTAL PROTEIN: 6.8 G/DL (ref 6.4–8.2)
TRIGL SERPL-MCNC: 89 MG/DL (ref 0–150)
VLDLC SERPL CALC-MCNC: 18 MG/DL
WBC # BLD: 9.7 K/UL (ref 4–11)

## 2022-10-05 PROCEDURE — 80053 COMPREHEN METABOLIC PANEL: CPT

## 2022-10-05 PROCEDURE — 6360000002 HC RX W HCPCS

## 2022-10-05 PROCEDURE — 84100 ASSAY OF PHOSPHORUS: CPT

## 2022-10-05 PROCEDURE — 6370000000 HC RX 637 (ALT 250 FOR IP)

## 2022-10-05 PROCEDURE — 86140 C-REACTIVE PROTEIN: CPT

## 2022-10-05 PROCEDURE — 80061 LIPID PANEL: CPT

## 2022-10-05 PROCEDURE — 6360000002 HC RX W HCPCS: Performed by: STUDENT IN AN ORGANIZED HEALTH CARE EDUCATION/TRAINING PROGRAM

## 2022-10-05 PROCEDURE — 36415 COLL VENOUS BLD VENIPUNCTURE: CPT

## 2022-10-05 PROCEDURE — 6370000000 HC RX 637 (ALT 250 FOR IP): Performed by: NURSE PRACTITIONER

## 2022-10-05 PROCEDURE — 85027 COMPLETE CBC AUTOMATED: CPT

## 2022-10-05 PROCEDURE — 83735 ASSAY OF MAGNESIUM: CPT

## 2022-10-05 PROCEDURE — 6360000002 HC RX W HCPCS: Performed by: NURSE PRACTITIONER

## 2022-10-05 PROCEDURE — 2060000000 HC ICU INTERMEDIATE R&B

## 2022-10-05 PROCEDURE — 97116 GAIT TRAINING THERAPY: CPT

## 2022-10-05 PROCEDURE — 2580000003 HC RX 258: Performed by: STUDENT IN AN ORGANIZED HEALTH CARE EDUCATION/TRAINING PROGRAM

## 2022-10-05 PROCEDURE — 6370000000 HC RX 637 (ALT 250 FOR IP): Performed by: STUDENT IN AN ORGANIZED HEALTH CARE EDUCATION/TRAINING PROGRAM

## 2022-10-05 PROCEDURE — 6370000000 HC RX 637 (ALT 250 FOR IP): Performed by: INTERNAL MEDICINE

## 2022-10-05 PROCEDURE — 2580000003 HC RX 258: Performed by: INTERNAL MEDICINE

## 2022-10-05 RX ORDER — SODIUM CHLORIDE, SODIUM LACTATE, POTASSIUM CHLORIDE, CALCIUM CHLORIDE 600; 310; 30; 20 MG/100ML; MG/100ML; MG/100ML; MG/100ML
INJECTION, SOLUTION INTRAVENOUS CONTINUOUS
Status: DISCONTINUED | OUTPATIENT
Start: 2022-10-05 | End: 2022-10-10 | Stop reason: HOSPADM

## 2022-10-05 RX ORDER — SENNA AND DOCUSATE SODIUM 50; 8.6 MG/1; MG/1
2 TABLET, FILM COATED ORAL 2 TIMES DAILY PRN
Status: DISCONTINUED | OUTPATIENT
Start: 2022-10-05 | End: 2022-10-06

## 2022-10-05 RX ORDER — OXYCODONE HYDROCHLORIDE 5 MG/1
5 TABLET ORAL EVERY 4 HOURS PRN
Status: DISCONTINUED | OUTPATIENT
Start: 2022-10-05 | End: 2022-10-10 | Stop reason: HOSPADM

## 2022-10-05 RX ORDER — LEVETIRACETAM 500 MG/1
500 TABLET ORAL 2 TIMES DAILY
Status: DISCONTINUED | OUTPATIENT
Start: 2022-10-05 | End: 2022-10-10 | Stop reason: HOSPADM

## 2022-10-05 RX ADMIN — HEPARIN SODIUM 5000 UNITS: 5000 INJECTION INTRAVENOUS; SUBCUTANEOUS at 04:39

## 2022-10-05 RX ADMIN — SODIUM CHLORIDE, POTASSIUM CHLORIDE, SODIUM LACTATE AND CALCIUM CHLORIDE: 600; 310; 30; 20 INJECTION, SOLUTION INTRAVENOUS at 16:05

## 2022-10-05 RX ADMIN — ONDANSETRON 4 MG: 2 INJECTION INTRAMUSCULAR; INTRAVENOUS at 09:11

## 2022-10-05 RX ADMIN — GABAPENTIN 100 MG: 100 CAPSULE ORAL at 14:39

## 2022-10-05 RX ADMIN — OXYCODONE 5 MG: 5 TABLET ORAL at 12:05

## 2022-10-05 RX ADMIN — GABAPENTIN 100 MG: 100 CAPSULE ORAL at 20:36

## 2022-10-05 RX ADMIN — OXYCODONE 5 MG: 5 TABLET ORAL at 20:42

## 2022-10-05 RX ADMIN — FENTANYL CITRATE 25 MCG: 50 INJECTION, SOLUTION INTRAMUSCULAR; INTRAVENOUS at 06:46

## 2022-10-05 RX ADMIN — SENNOSIDES AND DOCUSATE SODIUM 2 TABLET: 50; 8.6 TABLET ORAL at 22:38

## 2022-10-05 RX ADMIN — HEPARIN SODIUM 5000 UNITS: 5000 INJECTION INTRAVENOUS; SUBCUTANEOUS at 14:38

## 2022-10-05 RX ADMIN — ACETAMINOPHEN 650 MG: 325 TABLET, FILM COATED ORAL at 10:39

## 2022-10-05 RX ADMIN — OXYCODONE 5 MG: 5 TABLET ORAL at 08:04

## 2022-10-05 RX ADMIN — OXYCODONE 5 MG: 5 TABLET ORAL at 02:04

## 2022-10-05 RX ADMIN — OXYCODONE 5 MG: 5 TABLET ORAL at 16:50

## 2022-10-05 RX ADMIN — ACETAMINOPHEN 650 MG: 325 TABLET, FILM COATED ORAL at 04:39

## 2022-10-05 RX ADMIN — GABAPENTIN 100 MG: 100 CAPSULE ORAL at 09:11

## 2022-10-05 RX ADMIN — ACETAMINOPHEN 650 MG: 325 TABLET, FILM COATED ORAL at 14:39

## 2022-10-05 RX ADMIN — LEVETIRACETAM 500 MG: 500 TABLET, FILM COATED ORAL at 20:37

## 2022-10-05 RX ADMIN — SODIUM CHLORIDE 500 MG: 9 INJECTION, SOLUTION INTRAVENOUS at 09:19

## 2022-10-05 RX ADMIN — HEPARIN SODIUM 5000 UNITS: 5000 INJECTION INTRAVENOUS; SUBCUTANEOUS at 20:37

## 2022-10-05 RX ADMIN — ACETAMINOPHEN 650 MG: 325 TABLET, FILM COATED ORAL at 18:42

## 2022-10-05 ASSESSMENT — PAIN DESCRIPTION - ORIENTATION
ORIENTATION: LEFT

## 2022-10-05 ASSESSMENT — PAIN DESCRIPTION - PAIN TYPE
TYPE: ACUTE PAIN

## 2022-10-05 ASSESSMENT — PAIN DESCRIPTION - FREQUENCY
FREQUENCY: CONTINUOUS

## 2022-10-05 ASSESSMENT — PAIN DESCRIPTION - LOCATION
LOCATION: HEAD

## 2022-10-05 ASSESSMENT — PAIN DESCRIPTION - DESCRIPTORS
DESCRIPTORS: STABBING
DESCRIPTORS: PRESSURE
DESCRIPTORS: PRESSURE
DESCRIPTORS: STABBING
DESCRIPTORS: SHOOTING;SHARP
DESCRIPTORS: PRESSURE
DESCRIPTORS: SHARP;STABBING
DESCRIPTORS: SHARP;SHOOTING

## 2022-10-05 ASSESSMENT — PAIN SCALES - GENERAL
PAINLEVEL_OUTOF10: 5
PAINLEVEL_OUTOF10: 4
PAINLEVEL_OUTOF10: 4
PAINLEVEL_OUTOF10: 5
PAINLEVEL_OUTOF10: 3
PAINLEVEL_OUTOF10: 2
PAINLEVEL_OUTOF10: 2
PAINLEVEL_OUTOF10: 0
PAINLEVEL_OUTOF10: 3
PAINLEVEL_OUTOF10: 0
PAINLEVEL_OUTOF10: 6
PAINLEVEL_OUTOF10: 4
PAINLEVEL_OUTOF10: 3
PAINLEVEL_OUTOF10: 0
PAINLEVEL_OUTOF10: 5
PAINLEVEL_OUTOF10: 4
PAINLEVEL_OUTOF10: 3

## 2022-10-05 ASSESSMENT — PAIN - FUNCTIONAL ASSESSMENT

## 2022-10-05 ASSESSMENT — PAIN DESCRIPTION - ONSET
ONSET: ON-GOING
ONSET: AWAKENED FROM SLEEP

## 2022-10-05 NOTE — FLOWSHEET NOTE
Pt assess completed per flow sheet, pt c/o level 3/10 anterior left ha pain. He states this is much better than before. POC discussed with pt for night and all questions answered.

## 2022-10-05 NOTE — PLAN OF CARE
Problem: Discharge Planning  Goal: Discharge to home or other facility with appropriate resources  10/5/2022 1130 by Rehana Gordon RN  Outcome: Progressing  Flowsheets (Taken 10/4/2022 2351 by Alexi Benito RN)  Discharge to home or other facility with appropriate resources: Identify barriers to discharge with patient and caregiver    Problem: Pain  Goal: Verbalizes/displays adequate comfort level or baseline comfort level  10/5/2022 1130 by Rehana Gordon RN  Outcome: Progressing  Flowsheets  Taken 10/5/2022 1130 by Rehana Gordon RN  Verbalizes/displays adequate comfort level or baseline comfort level:   Encourage patient to monitor pain and request assistance   Assess pain using appropriate pain scale   Administer analgesics based on type and severity of pain and evaluate response  Taken 10/4/2022 2351 by Alexi Benito RN  Verbalizes/displays adequate comfort level or baseline comfort level:   Assess pain using appropriate pain scale   Encourage patient to monitor pain and request assistance   Administer analgesics based on type and severity of pain and evaluate response   Implement non-pharmacological measures as appropriate and evaluate response  Note: Patient using PRN oxycodone and tylenol for pain management. MD increased frequency to q4h.      Problem: Neurosensory - Adult  Goal: Achieves stable or improved neurological status  10/5/2022 1130 by Rehana Gordon RN  Outcome: Progressing    Problem: ABCDS Injury Assessment  Goal: Absence of physical injury  10/5/2022 1130 by Rehana Gordon RN  Outcome: Progressing  Flowsheets  Taken 10/5/2022 0911 by Rehana Gordon RN  Absence of Physical Injury: Implement safety measures based on patient assessment

## 2022-10-05 NOTE — PLAN OF CARE
Problem: Discharge Planning  Goal: Discharge to home or other facility with appropriate resources  10/4/2022 2351 by Dillon Rdz RN  Outcome: Progressing  Flowsheets (Taken 10/4/2022 2351)  Discharge to home or other facility with appropriate resources: Identify barriers to discharge with patient and caregiver     Problem: Pain  Goal: Verbalizes/displays adequate comfort level or baseline comfort level  10/4/2022 2351 by Dillon Rdz RN  Outcome: Progressing  Flowsheets (Taken 10/4/2022 2351)  Verbalizes/displays adequate comfort level or baseline comfort level:   Assess pain using appropriate pain scale   Encourage patient to monitor pain and request assistance   Administer analgesics based on type and severity of pain and evaluate response   Implement non-pharmacological measures as appropriate and evaluate response     Problem: Neurosensory - Adult  Goal: Achieves stable or improved neurological status  10/4/2022 2351 by Dillon Rdz RN  Outcome: Progressing  Flowsheets (Taken 10/4/2022 2351)  Achieves stable or improved neurological status: Assess for and report changes in neurological status     Problem: ABCDS Injury Assessment  Goal: Absence of physical injury  Outcome: Progressing  Flowsheets (Taken 10/4/2022 2351)  Absence of Physical Injury: Implement safety measures based on patient assessment

## 2022-10-05 NOTE — PROGRESS NOTES
NEUROSURGERY PROGRESS NOTE       Patient Name: Aicha Rojas YOB: 1975   Sex: Male Age: 56 yrs     CC / Reason for Consult: non-traumatic SAH/SDH    Changes over last 24 hours: None  Patient doing well      ROS: no new complaints   HISTORY   Interval Hx:   Aicha Rojas is a 56 y.o. y/o male with no significant PMH who presents with progressively worsening headaches. He states headaches started several days ago after hearing a pop behind L eye, immediately he had a warm sensation over left side of face and blurry vision in the left eye, both symptoms resolved quickly and have not returned. Has since had a headache that started mild and consistently worsened each day, tried to take ASA for headache relief, but no relief obtained. He also started to get a more sharp stabbing pain to left side of face. He denies any weakness, dizziness, confusion. Denies having hx of headaches like this previously. PMH History reviewed. No pertinent past medical history. Allergies No Known Allergies   Diet ADULT DIET;  Regular  Diet NPO Exceptions are: Sips of Water with Meds   Isolation No active isolations     LABS   Metabolic Panel Recent Labs     10/03/22  0721 10/04/22  0337 10/05/22  0641    135* 137   K 4.5 4.8 4.7    100 102   CO2 26 22 25   BUN 16 15 11   CREATININE 0.9 0.8* 0.9   GLUCOSE 112* 104* 102*   CALCIUM 9.5 8.9 9.2   LABALBU 4.9  --  4.5   PHOS  --   --  2.7   MG  --   --  2.50*   ALKPHOS 113  --  110   ALT 47*  --  36   AST 27  --  21      CBC / Coags Recent Labs     10/03/22  0721 10/03/22  0914 10/04/22  0312 10/05/22  0641   WBC 9.6  --  11.4* 9.7   RBC 5.02  --  4.53 4.79   HGB 15.8  --  14.4 15.3   HCT 46.0  --  41.5 43.2     --  200 208   INR  --  1.06  --   --       Other Lab Results   Component Value Date/Time    LABA1C 5.1 10/04/2022 03:12 AM    LDLCALC 88 02/13/2020 01:01 PM    TRIG 80 02/13/2020 01:01 PM     No results found for: PHENYTOIN, [address] LAMO, VALPROATE, LACTSEPSIS, LACTA       CURRENT SCHEDULED MEDICATIONS   Inpatient Medications     levETIRAcetam, 500 mg, Oral, BID    heparin (porcine), 5,000 Units, SubCUTAneous, 3 times per day    gabapentin, 100 mg, Oral, TID    atorvastatin, 40 mg, Oral, Nightly   Infusions      Antibiotics   Recent Abx Admin        No antibiotic orders with administrations found. DIAGNOSTICS   IMAGES:  No new imaging    PHYSICAL EXAMINATION     PHYSICAL EXAM:  Vitals:    10/05/22 0834 10/05/22 1017 10/05/22 1203 10/05/22 1235   BP:   127/86    Pulse:       Resp: 16  16 18   Temp:   97.6 °F (36.4 °C)    TempSrc:   Oral    SpO2:   99%    Weight:  263 lb 6.4 oz (119.5 kg)     Height:             Neurologic  Mental status:   Orientation to person, place, time, situation  Attention intact as able to attend well to the exam    Language fluent in conversation  Comprehension intact; follows simple commands     Cranial nerves:   Pupils equal   Gaze conjugate  EOM's intact  Face symmetric     Motor Exam:    R  L    Deltoid 5 5   Biceps 5 5   Triceps 5 5   Wrist extension  5 5   Interossei 5 5        R  L    Hip flexion  5 5   Hip extension  5 5   Knee flexion  5 5   Knee extension  5 5   Ankle dorsiflexion  5 5   Ankle plantar flexion  5 5      Sensory: light touch intact and symmetric in all 4 extremities     Tone: normal in all 4 extremities     Cardiovascular: Warm, appears well perfused   Respiratory: Easy, non-labored respiratory pattern   Abdominal: Abdomen is without distention   Extremities: Upper and lower extremities are atraumatic in appearance without deformity. ASSESSMENT & RECOMMENDATIONS   IMPRESSION:  Patient is a 55 y/o M w/ spontaneous SAH and SDH, concerning for possible vascular etiology. Patient denies any trauma. CTA / MRI/MRA negative. RECOMMENDATIONS:  -Diagnostic cerebral angiogram Thursday at 1300  Neurologic exams frequency: Q4H  -Check ESR.  CRP elevated   -Keppra 500mg BID x 7 days total  -Maintain SBP <160;  If PRN med insufficient after 30 minutes, start Nicardipine infusion  -Keep Plt >100k & INR <1.4  -Hold all anticoagulation & antiplatelet for 2 weeks (ok to give sq heparin for dvt prophylaxis)  -We will follow      MISSY Ricketts - CNP   10/05/22  1:47 PM

## 2022-10-05 NOTE — PROGRESS NOTES
Clinical Pharmacy Progress Note    All IVs in NS - Management by Pharmacy    Consult Date(s): 10/3  Consulting Provider(s): Dr Adi Pleitez / Plan  Acute SDH + SAH   - All IVs in Normal Saline  Drips will be adjusted to normal saline as appropriate based on compatibility, in an effort to avoid fluid shifts, as D5W is osmotically active. The following intermittent IV drips / infusions have been adjusted to saline:  Levetiracetam  If ordered, the following medications must remain in D5W due to incompatibility with normal saline:  None at this time  Note: Patient may have dextrose ordered as part of hypoglycemia treatment protocol. Total IV fluid delivered to patient over last 24 hrs: ~1200 mL  Pharmacist will follow daily to ensure all IVPBs / drips are in NS where possible.     Please call with questions--  Thanks--  James Manuel, PharmD, [facility], [facility]  [id number] ([facility])   10/5/2022 9:16 AM

## 2022-10-05 NOTE — PROGRESS NOTES
Hospitalist Progress Note      PCP: No primary care provider on file. Date of Admission: 10/3/2022    Chief Complaint:   Worsening headache    Subjective:     He continues to experience significant left sided headache. He denies visual changes, focal weakness, numbness, tingling, nausea, vomiting. He denies history of fevers, chills. He developed COVID19 infection 1 month ago, and had a violent cough. He denies history of fall, trauma to head. He developed headache    Medications:  Reviewed    Infusion Medications   Scheduled Medications    heparin (porcine)  5,000 Units SubCUTAneous 3 times per day    gabapentin  100 mg Oral TID    atorvastatin  40 mg Oral Nightly    levETIRAcetam  500 mg IntraVENous Q12H     PRN Meds: oxyCODONE, fentanNYL, acetaminophen, ondansetron **OR** ondansetron, meclizine, promethazine      Intake/Output Summary (Last 24 hours) at 10/4/2022 9948  Last data filed at 10/4/2022 0545  Gross per 24 hour   Intake 678.38 ml   Output 750 ml   Net -71.62 ml       Physical Exam Performed:    BP (!) 150/85   Pulse 54   Temp 98 °F (36.7 °C) (Temporal)   Resp 16   Ht 6' (1.829 m)   Wt 266 lb 15.6 oz (121.1 kg)   SpO2 97%   BMI 36.21 kg/m²       GEN alert, in no distress  HEENT normocephalic, anicteric sclera, EOMI, mucosa moist, no stridor  NECK supple, trachea midline  RESP on RA, in no distress, clear to auscultation  CARDS RRR, S1, S2, no murmurs, no edema, radial pulse 2+, DP pulse  ABD +BS, soft nontender  MSK no cyanosis, no clubbing  SKIN warm, dry  NEURO alert, oriented x 3, no facial asymmetry, no dysarthria, no protonator drift, UE and LE strength 5/5, gross sensory to light touch intact, finger to nose without dysmetria.   PSYCH normal mood        Labs:   Recent Labs     10/03/22  0721 10/04/22  0312   WBC 9.6 11.4*   HGB 15.8 14.4   HCT 46.0 41.5    200     Recent Labs     10/03/22  0721 10/04/22  0337    135*   K 4.5 4.8    100   CO2 26 22   BUN 16 15   CREATININE 0.9 0.8*   CALCIUM 9.5 8.9     Recent Labs     10/03/22  0721   AST 27   ALT 47*   BILITOT 0.7   ALKPHOS 113     Recent Labs     10/03/22  0914   INR 1.06     No results for input(s): Becki Mo in the last 72 hours. Urinalysis:      Lab Results   Component Value Date/Time    NITRU Negative 02/13/2020 11:04 AM    BLOODU Negative 02/13/2020 11:04 AM    SPECGRAV 1.010 02/13/2020 11:04 AM    GLUCOSEU Negative 02/13/2020 11:04 AM       Radiology:  MRI BRAIN W WO CONTRAST   Final Result   1. Thin but fairly diffuse left convexity and tentorial subdural hematoma measuring up to 3 mm in thickness unchanged since previous noncontrast head CT. MRA BRAIN:      FINDINGS: The internal carotid arteries are patent and normal in caliber through the skull base. The middle cerebral and anterior cerebral arteries are patent and normal in caliber. The anterior communicating artery is normal. Posterior cerebral arteries    are patent bilaterally. The basilar artery is patent and normal in caliber. Distal vertebral arteries are patent and normal in caliber. IMPRESSION:   1. Normal MRA of the brain. MRV brain:      FINDINGS: The internal jugular veins, sigmoid sinuses, transverse sinuses, straight sinus, and superior sagittal sinus are widely patent and normal in appearance. IMPRESSION:   1. Normal MRV of the brain. MRV HEAD W WO CONTRAST   Final Result   1. Thin but fairly diffuse left convexity and tentorial subdural hematoma measuring up to 3 mm in thickness unchanged since previous noncontrast head CT. MRA BRAIN:      FINDINGS: The internal carotid arteries are patent and normal in caliber through the skull base. The middle cerebral and anterior cerebral arteries are patent and normal in caliber. The anterior communicating artery is normal. Posterior cerebral arteries    are patent bilaterally. The basilar artery is patent and normal in caliber.  Distal vertebral arteries are patent and normal in caliber. IMPRESSION:   1. Normal MRA of the brain. MRV brain:      FINDINGS: The internal jugular veins, sigmoid sinuses, transverse sinuses, straight sinus, and superior sagittal sinus are widely patent and normal in appearance. IMPRESSION:   1. Normal MRV of the brain. MRA HEAD WO CONTRAST   Final Result   1. Thin but fairly diffuse left convexity and tentorial subdural hematoma measuring up to 3 mm in thickness unchanged since previous noncontrast head CT. MRA BRAIN:      FINDINGS: The internal carotid arteries are patent and normal in caliber through the skull base. The middle cerebral and anterior cerebral arteries are patent and normal in caliber. The anterior communicating artery is normal. Posterior cerebral arteries    are patent bilaterally. The basilar artery is patent and normal in caliber. Distal vertebral arteries are patent and normal in caliber. IMPRESSION:   1. Normal MRA of the brain. MRV brain:      FINDINGS: The internal jugular veins, sigmoid sinuses, transverse sinuses, straight sinus, and superior sagittal sinus are widely patent and normal in appearance. IMPRESSION:   1. Normal MRV of the brain. Assessment/Plan:    Active Hospital Problems    Diagnosis Date Noted    Subarachnoid hemorrhage ([facility] [address] [I60.9] 10/03/2022     Priority: Medium    Subdural hematoma [S06. 5XAA] 10/03/2022     Priority: Medium     Spontaneous SDH and SAH  - no history of trauma, fall.  - no history of hypertension.  - no history of easy bruising or bleeding.  - no history of drug use. - recent COVID19 infection one month ago and had a violent cough for 2 weeks which resolved 2 weeks ago. - UDS 10/4 positive for fentanyl and oxycodone, which patient is receiving inpatient.  - Received desmopressin at [facility].  - Start keppra 500 mg BID.  - Maintain SBP <160.   Pt not hypertensive.   - Goal platelet > 094 K and INR < 1.4.  - Neuro checks Q4H.  - neurosurgery plan to perform cerebral angiogram Wednesday or Thursday. Headache  - in the setting of SAH, SDH.  - pain control    HLD  - Continue home lipitor 40 mg HS. DVT Prophylaxis: SCD  Diet: ADULT DIET; Regular  Code Status: Full Code    PT/OT Eval Status: patient able to ambulate. Dispo - once stable.     Nataly Beckman MD

## 2022-10-05 NOTE — CARE COORDINATION
2:11 PM  Following for possible needs at discharge. Currently anticipating that patient will return home with no needs at discharge.

## 2022-10-05 NOTE — PROGRESS NOTES
Physical Therapy  Facility/Department: [address]  Physical Therapy Daily Treatment    Name: Jane Daniels  : 1975  MRN: [id number]  Date of Service: 10/5/2022    Discharge Recommendations: Jane Daniels scored a 24/24 on the AM-PAC short mobility form. At this time, no further PT is recommended upon discharge. Recommend patient returns to prior setting with prior services. PT Equipment Recommendations  Equipment Needed: No      Patient Diagnosis(es): There were no encounter diagnoses. Past Medical History:  has no past medical history on file. Past Surgical History:  has no past surgical history on file. Assessment   Assessment: Pt at baseline for all functional mobility, transfers and ambulation at this time. Pt has no concerns for return to home. PT also has no concerns at this time. WIll sign off. Treatment Diagnosis: Functional mobility impairment  Therapy Prognosis: Excellent  Decision Making: Low Complexity  Requires PT Follow-Up: No  Activity Tolerance  Activity Tolerance: Patient tolerated treatment well     Plan   Physcial Therapy Plan  General Plan: Discharge  Current Treatment Recommendations: Gait training, Stair training  Safety Devices  Type of Devices: All fall risk precautions in place, Left in bed, Nurse notified, Call light within reach (Sister in the room)  Restraints  Restraints Initially in Place: No     Restrictions  Position Activity Restriction  Other position/activity restrictions: HOB elevated to 30 degrees, Verbal clearing by neuro and nursing     Subjective   General  Chart Reviewed: Yes  Patient assessed for rehabilitation services?: Yes  Additional Pertinent Hx: Pt is a 55 yo male that presents to the hospital 10/3 with co of worsening headache over the past week. Pt states he felt a pop behind his eye and has a headache ever since. Head CT: Acute left extra-axial hemorrhage. There is a small left frontotemporal  subdural hematoma.   Left temporal subarachnoid hemorrhage component is also  present. PMH:TIA, hypogonadism, hypothyroidism. Referring Practitioner: Montrell Gordon MD  Referral Date : 10/03/22  Diagnosis: Subarachnoid Hemorrhage  General Comment  Comments: Talked to RN, cleared by her and neuro for PT/OT evaluations  Subjective  Subjective: Pt supine in bed. Agreeable to therapy with guest in the room. Complains of slight headache and dizziness without glasses on. Social/Functional History  Social/Functional History  Lives With: Spouse (3 children (13, 6, 9))  Type of Home: House  Home Layout: Multi-level  Home Access: Stairs to enter with rails  Entrance Stairs - Number of Steps: 2  Bathroom Shower/Tub: Tub/Shower unit  Bathroom Toilet: Standard  Bathroom Equipment: Grab bars in shower  Home Equipment: Gaetano Cantu  Has the patient had two or more falls in the past year or any fall with injury in the past year?: Yes  ADL Assistance: [place]: Independent  Homemaking Responsibilities: Yes  Ambulation Assistance: Independent  Transfer Assistance: Independent  Active : Yes  Occupation: Full time employment  Type of Occupation:  for [employer]  [address]: Impaired  Vision Exceptions: Wears glasses at all times  Hearing  Hearing: Within functional limits    Cognition   Orientation  Overall Orientation Status: Within Normal Limits  Orientation Level: Oriented X4     Objective   Heart Rate: 50  Heart Rate Source: Monitor  BP: 120/85  BP Location: Left upper arm  BP Method: Automatic  Patient Position: Semi fowlers  MAP (Calculated): 96.67  Resp: 16  SpO2: 100 %  O2 Device: None (Room air)        Gross Assessment  AROM: Within functional limits  Strength:  Within functional limits                    Bed mobility  Supine to Sit: Modified independent  Sit to Supine: Modified independent  Scooting: Modified independent  Transfers  Sit to Stand: Modified independent  Stand to Sit: Modified independent  Bed to Chair: Modified independent  Ambulation  Surface: Level tile  Device: No Device  Assistance: Modified Independent  Quality of Gait: Steady  Gait Deviations: None  Distance: >900ft in the unit  Stairs/Curb  Stairs?: Yes  Stairs  # Steps : 12  Stairs Height: 6\"  Rails: None  Assistance: Modified independent   Comment: Reciprocal gait pattern ascending and descending     Balance  Sitting - Static: Good  Sitting - Dynamic: Good  Standing - Static: Good  Standing - Dynamic: Good  Exercise Treatment: Standing balance with anterior and posterior perturbations 3x1 mins (MOD I)        OutComes Score      AM-PAC Score  AM-PAC Inpatient Mobility Raw Score : 24 (10/05/22 0854)  AM-PAC Inpatient T-Scale Score : 61.14 (10/05/22 0854)  Mobility Inpatient CMS 0-100% Score: 0 (10/05/22 0854)  Mobility Inpatient CMS G-Code Modifier : CH (10/05/22 0854)          Tinneti Score       Goals  Short Term Goals  Time Frame for Short Term Goals: discharge  Short Term Goal 1: Pt will tolerate an ambulation assessment- met 10/5  Short Term Goal 2: Pt will tolerate a stair assessment-met 10/5  Patient Goals   Patient Goals :  To return home       Education  Patient Education  Education Given To: Patient  Education Provided: Role of Therapy  Education Method: Demonstration  Barriers to Learning: None  Education Outcome: Verbalized understanding      Therapy Time   Individual Concurrent Group Co-treatment   Time In 0810         Time Out 0833         Minutes 23          Timed Code Treatment Minutes:   23    Total Treatment Minutes:  [address], [place] #[id number]

## 2022-10-06 ENCOUNTER — APPOINTMENT (OUTPATIENT)
Dept: INTERVENTIONAL RADIOLOGY/VASCULAR | Age: 47
DRG: 066 | End: 2022-10-06
Attending: INTERNAL MEDICINE
Payer: COMMERCIAL

## 2022-10-06 ENCOUNTER — APPOINTMENT (OUTPATIENT)
Dept: CT IMAGING | Age: 47
DRG: 066 | End: 2022-10-06
Attending: INTERNAL MEDICINE
Payer: COMMERCIAL

## 2022-10-06 LAB
ANION GAP SERPL CALCULATED.3IONS-SCNC: 9 MMOL/L (ref 3–16)
BASOPHILS ABSOLUTE: 0.1 K/UL (ref 0–0.2)
BASOPHILS RELATIVE PERCENT: 0.8 %
BUN BLDV-MCNC: 11 MG/DL (ref 7–20)
CALCIUM SERPL-MCNC: 9.2 MG/DL (ref 8.3–10.6)
CHLORIDE BLD-SCNC: 101 MMOL/L (ref 99–110)
CO2: 25 MMOL/L (ref 21–32)
CREAT SERPL-MCNC: 1 MG/DL (ref 0.9–1.3)
EOSINOPHILS ABSOLUTE: 0.1 K/UL (ref 0–0.6)
EOSINOPHILS RELATIVE PERCENT: 1.1 %
GFR AFRICAN AMERICAN: >60
GFR NON-AFRICAN AMERICAN: >60
GLUCOSE BLD-MCNC: 93 MG/DL (ref 70–99)
HCT VFR BLD CALC: 43 % (ref 40.5–52.5)
HEMOGLOBIN: 15 G/DL (ref 13.5–17.5)
LYMPHOCYTES ABSOLUTE: 1 K/UL (ref 1–5.1)
LYMPHOCYTES RELATIVE PERCENT: 11.7 %
MAGNESIUM: 2.3 MG/DL (ref 1.8–2.4)
MCH RBC QN AUTO: 31.5 PG (ref 26–34)
MCHC RBC AUTO-ENTMCNC: 34.8 G/DL (ref 31–36)
MCV RBC AUTO: 90.5 FL (ref 80–100)
MONOCYTES ABSOLUTE: 0.5 K/UL (ref 0–1.3)
MONOCYTES RELATIVE PERCENT: 5.8 %
NEUTROPHILS ABSOLUTE: 7 K/UL (ref 1.7–7.7)
NEUTROPHILS RELATIVE PERCENT: 80.6 %
PDW BLD-RTO: 13 % (ref 12.4–15.4)
PLATELET # BLD: 212 K/UL (ref 135–450)
PMV BLD AUTO: 8.8 FL (ref 5–10.5)
POTASSIUM SERPL-SCNC: 3.8 MMOL/L (ref 3.5–5.1)
RBC # BLD: 4.75 M/UL (ref 4.2–5.9)
SODIUM BLD-SCNC: 135 MMOL/L (ref 136–145)
WBC # BLD: 8.7 K/UL (ref 4–11)

## 2022-10-06 PROCEDURE — 36224 PLACE CATH CAROTD ART: CPT

## 2022-10-06 PROCEDURE — C1760 CLOSURE DEV, VASC: HCPCS

## 2022-10-06 PROCEDURE — 6370000000 HC RX 637 (ALT 250 FOR IP): Performed by: INTERNAL MEDICINE

## 2022-10-06 PROCEDURE — 2580000003 HC RX 258: Performed by: INTERNAL MEDICINE

## 2022-10-06 PROCEDURE — 36415 COLL VENOUS BLD VENIPUNCTURE: CPT

## 2022-10-06 PROCEDURE — 6360000002 HC RX W HCPCS: Performed by: INTERNAL MEDICINE

## 2022-10-06 PROCEDURE — 6360000002 HC RX W HCPCS: Performed by: NURSE PRACTITIONER

## 2022-10-06 PROCEDURE — 36226 PLACE CATH VERTEBRAL ART: CPT

## 2022-10-06 PROCEDURE — 84439 ASSAY OF FREE THYROXINE: CPT

## 2022-10-06 PROCEDURE — 6370000000 HC RX 637 (ALT 250 FOR IP): Performed by: NURSE PRACTITIONER

## 2022-10-06 PROCEDURE — 6370000000 HC RX 637 (ALT 250 FOR IP): Performed by: STUDENT IN AN ORGANIZED HEALTH CARE EDUCATION/TRAINING PROGRAM

## 2022-10-06 PROCEDURE — C1887 CATHETER, GUIDING: HCPCS

## 2022-10-06 PROCEDURE — 80048 BASIC METABOLIC PNL TOTAL CA: CPT

## 2022-10-06 PROCEDURE — 2580000003 HC RX 258: Performed by: NURSE PRACTITIONER

## 2022-10-06 PROCEDURE — B3181ZZ FLUOROSCOPY OF BILATERAL INTERNAL CAROTID ARTERIES USING LOW OSMOLAR CONTRAST: ICD-10-PCS | Performed by: SINGLE SPECIALTY

## 2022-10-06 PROCEDURE — 89050 BODY FLUID CELL COUNT: CPT

## 2022-10-06 PROCEDURE — 76377 3D RENDER W/INTRP POSTPROCES: CPT

## 2022-10-06 PROCEDURE — 85025 COMPLETE CBC W/AUTO DIFF WBC: CPT

## 2022-10-06 PROCEDURE — 99153 MOD SED SAME PHYS/QHP EA: CPT

## 2022-10-06 PROCEDURE — 83735 ASSAY OF MAGNESIUM: CPT

## 2022-10-06 PROCEDURE — B31F1ZZ FLUOROSCOPY OF LEFT VERTEBRAL ARTERY USING LOW OSMOLAR CONTRAST: ICD-10-PCS | Performed by: SINGLE SPECIALTY

## 2022-10-06 PROCEDURE — 6370000000 HC RX 637 (ALT 250 FOR IP)

## 2022-10-06 PROCEDURE — 99152 MOD SED SAME PHYS/QHP 5/>YRS: CPT

## 2022-10-06 PROCEDURE — C1894 INTRO/SHEATH, NON-LASER: HCPCS

## 2022-10-06 PROCEDURE — 6360000004 HC RX CONTRAST MEDICATION: Performed by: SINGLE SPECIALTY

## 2022-10-06 PROCEDURE — 36227 PLACE CATH XTRNL CAROTID: CPT

## 2022-10-06 PROCEDURE — 84443 ASSAY THYROID STIM HORMONE: CPT

## 2022-10-06 PROCEDURE — 70450 CT HEAD/BRAIN W/O DYE: CPT

## 2022-10-06 PROCEDURE — B31Q1ZZ FLUOROSCOPY OF CERVICO-CEREBRAL ARCH USING LOW OSMOLAR CONTRAST: ICD-10-PCS | Performed by: SINGLE SPECIALTY

## 2022-10-06 PROCEDURE — C1769 GUIDE WIRE: HCPCS

## 2022-10-06 PROCEDURE — 2060000000 HC ICU INTERMEDIATE R&B

## 2022-10-06 RX ORDER — MORPHINE SULFATE 2 MG/ML
2 INJECTION, SOLUTION INTRAMUSCULAR; INTRAVENOUS ONCE
Status: COMPLETED | OUTPATIENT
Start: 2022-10-06 | End: 2022-10-06

## 2022-10-06 RX ORDER — NALOXONE HYDROCHLORIDE 0.4 MG/ML
0.4 INJECTION, SOLUTION INTRAMUSCULAR; INTRAVENOUS; SUBCUTANEOUS PRN
Status: DISCONTINUED | OUTPATIENT
Start: 2022-10-06 | End: 2022-10-10 | Stop reason: HOSPADM

## 2022-10-06 RX ORDER — IODIXANOL 270 MG/ML
200 INJECTION, SOLUTION INTRAVASCULAR ONCE
Status: COMPLETED | OUTPATIENT
Start: 2022-10-06 | End: 2022-10-06

## 2022-10-06 RX ORDER — MORPHINE SULFATE 2 MG/ML
2 INJECTION, SOLUTION INTRAMUSCULAR; INTRAVENOUS
Status: DISCONTINUED | OUTPATIENT
Start: 2022-10-06 | End: 2022-10-07

## 2022-10-06 RX ORDER — LORAZEPAM 0.5 MG/1
0.5 TABLET ORAL EVERY 6 HOURS PRN
Status: DISCONTINUED | OUTPATIENT
Start: 2022-10-06 | End: 2022-10-10 | Stop reason: HOSPADM

## 2022-10-06 RX ORDER — SENNA AND DOCUSATE SODIUM 50; 8.6 MG/1; MG/1
2 TABLET, FILM COATED ORAL DAILY
Status: DISCONTINUED | OUTPATIENT
Start: 2022-10-07 | End: 2022-10-10 | Stop reason: HOSPADM

## 2022-10-06 RX ORDER — MORPHINE SULFATE 2 MG/ML
2 INJECTION, SOLUTION INTRAMUSCULAR; INTRAVENOUS
Status: DISCONTINUED | OUTPATIENT
Start: 2022-10-06 | End: 2022-10-06

## 2022-10-06 RX ORDER — GABAPENTIN 100 MG/1
100 CAPSULE ORAL ONCE
Status: DISCONTINUED | OUTPATIENT
Start: 2022-10-06 | End: 2022-10-10 | Stop reason: SDUPTHER

## 2022-10-06 RX ORDER — GABAPENTIN 100 MG/1
200 CAPSULE ORAL 3 TIMES DAILY
Status: DISCONTINUED | OUTPATIENT
Start: 2022-10-06 | End: 2022-10-10

## 2022-10-06 RX ORDER — OXYCODONE HYDROCHLORIDE 5 MG/1
10 TABLET ORAL EVERY 4 HOURS PRN
Status: DISCONTINUED | OUTPATIENT
Start: 2022-10-06 | End: 2022-10-10 | Stop reason: HOSPADM

## 2022-10-06 RX ADMIN — MORPHINE SULFATE 2 MG: 2 INJECTION, SOLUTION INTRAMUSCULAR; INTRAVENOUS at 18:38

## 2022-10-06 RX ADMIN — LORAZEPAM 0.5 MG: 0.5 TABLET ORAL at 16:05

## 2022-10-06 RX ADMIN — ACETAMINOPHEN 650 MG: 325 TABLET, FILM COATED ORAL at 17:38

## 2022-10-06 RX ADMIN — IODIXANOL 140 ML: 270 INJECTION, SOLUTION INTRAVASCULAR at 14:45

## 2022-10-06 RX ADMIN — OXYCODONE 10 MG: 5 TABLET ORAL at 19:14

## 2022-10-06 RX ADMIN — GABAPENTIN 200 MG: 100 CAPSULE ORAL at 15:18

## 2022-10-06 RX ADMIN — MORPHINE SULFATE 2 MG: 2 INJECTION, SOLUTION INTRAMUSCULAR; INTRAVENOUS at 11:45

## 2022-10-06 RX ADMIN — GABAPENTIN 100 MG: 100 CAPSULE ORAL at 06:24

## 2022-10-06 RX ADMIN — SODIUM CHLORIDE 1000 MG: 9 INJECTION, SOLUTION INTRAVENOUS at 17:45

## 2022-10-06 RX ADMIN — OXYCODONE 5 MG: 5 TABLET ORAL at 04:33

## 2022-10-06 RX ADMIN — GABAPENTIN 200 MG: 100 CAPSULE ORAL at 20:40

## 2022-10-06 RX ADMIN — OXYCODONE 5 MG: 5 TABLET ORAL at 00:30

## 2022-10-06 RX ADMIN — ACETAMINOPHEN 650 MG: 325 TABLET, FILM COATED ORAL at 10:21

## 2022-10-06 RX ADMIN — SODIUM CHLORIDE, POTASSIUM CHLORIDE, SODIUM LACTATE AND CALCIUM CHLORIDE: 600; 310; 30; 20 INJECTION, SOLUTION INTRAVENOUS at 00:32

## 2022-10-06 RX ADMIN — ACETAMINOPHEN 650 MG: 325 TABLET, FILM COATED ORAL at 04:40

## 2022-10-06 RX ADMIN — LEVETIRACETAM 500 MG: 500 TABLET, FILM COATED ORAL at 08:50

## 2022-10-06 RX ADMIN — ACETAMINOPHEN 650 MG: 325 TABLET, FILM COATED ORAL at 00:31

## 2022-10-06 RX ADMIN — OXYCODONE 5 MG: 5 TABLET ORAL at 09:27

## 2022-10-06 RX ADMIN — OXYCODONE 5 MG: 5 TABLET ORAL at 15:14

## 2022-10-06 RX ADMIN — LORAZEPAM 0.5 MG: 0.5 TABLET ORAL at 22:12

## 2022-10-06 RX ADMIN — LEVETIRACETAM 500 MG: 500 TABLET, FILM COATED ORAL at 20:41

## 2022-10-06 RX ADMIN — MORPHINE SULFATE 2 MG: 2 INJECTION, SOLUTION INTRAMUSCULAR; INTRAVENOUS at 12:45

## 2022-10-06 RX ADMIN — MORPHINE SULFATE 2 MG: 2 INJECTION, SOLUTION INTRAMUSCULAR; INTRAVENOUS at 20:41

## 2022-10-06 RX ADMIN — SODIUM CHLORIDE, POTASSIUM CHLORIDE, SODIUM LACTATE AND CALCIUM CHLORIDE: 600; 310; 30; 20 INJECTION, SOLUTION INTRAVENOUS at 10:25

## 2022-10-06 RX ADMIN — MORPHINE SULFATE 2 MG: 2 INJECTION, SOLUTION INTRAMUSCULAR; INTRAVENOUS at 15:49

## 2022-10-06 ASSESSMENT — PAIN DESCRIPTION - DESCRIPTORS
DESCRIPTORS: ACHING
DESCRIPTORS: ACHING;SHOOTING;STABBING
DESCRIPTORS: ACHING
DESCRIPTORS: ACHING;STABBING
DESCRIPTORS: ACHING
DESCRIPTORS: ACHING
DESCRIPTORS: THROBBING
DESCRIPTORS: ACHING;PRESSURE
DESCRIPTORS: ACHING;STABBING
DESCRIPTORS: ACHING
DESCRIPTORS: ACHING;STABBING
DESCRIPTORS: ACHING;PRESSURE
DESCRIPTORS: STABBING
DESCRIPTORS: STABBING

## 2022-10-06 ASSESSMENT — PAIN - FUNCTIONAL ASSESSMENT
PAIN_FUNCTIONAL_ASSESSMENT: PREVENTS OR INTERFERES SOME ACTIVE ACTIVITIES AND ADLS
PAIN_FUNCTIONAL_ASSESSMENT: ACTIVITIES ARE NOT PREVENTED
PAIN_FUNCTIONAL_ASSESSMENT: PREVENTS OR INTERFERES WITH MANY ACTIVE NOT PASSIVE ACTIVITIES

## 2022-10-06 ASSESSMENT — PAIN DESCRIPTION - LOCATION
LOCATION: HEAD

## 2022-10-06 ASSESSMENT — PAIN DESCRIPTION - ORIENTATION
ORIENTATION: LEFT
ORIENTATION: LEFT;MID
ORIENTATION: LEFT

## 2022-10-06 ASSESSMENT — PAIN SCALES - GENERAL
PAINLEVEL_OUTOF10: 5
PAINLEVEL_OUTOF10: 2
PAINLEVEL_OUTOF10: 6
PAINLEVEL_OUTOF10: 6
PAINLEVEL_OUTOF10: 10
PAINLEVEL_OUTOF10: 4
PAINLEVEL_OUTOF10: 6
PAINLEVEL_OUTOF10: 3
PAINLEVEL_OUTOF10: 3
PAINLEVEL_OUTOF10: 7
PAINLEVEL_OUTOF10: 6
PAINLEVEL_OUTOF10: 6
PAINLEVEL_OUTOF10: 5
PAINLEVEL_OUTOF10: 5
PAINLEVEL_OUTOF10: 6
PAINLEVEL_OUTOF10: 9
PAINLEVEL_OUTOF10: 10
PAINLEVEL_OUTOF10: 3
PAINLEVEL_OUTOF10: 10
PAINLEVEL_OUTOF10: 4

## 2022-10-06 ASSESSMENT — PAIN DESCRIPTION - FREQUENCY
FREQUENCY: CONTINUOUS
FREQUENCY: CONTINUOUS
FREQUENCY: INTERMITTENT
FREQUENCY: CONTINUOUS

## 2022-10-06 ASSESSMENT — PAIN DESCRIPTION - PAIN TYPE
TYPE: ACUTE PAIN

## 2022-10-06 ASSESSMENT — PAIN DESCRIPTION - ONSET
ONSET: PROGRESSIVE
ONSET: ON-GOING
ONSET: AWAKENED FROM SLEEP
ONSET: PROGRESSIVE
ONSET: ON-GOING
ONSET: AWAKENED FROM SLEEP

## 2022-10-06 ASSESSMENT — PAIN SCALES - WONG BAKER: WONGBAKER_NUMERICALRESPONSE: 0

## 2022-10-06 NOTE — PROGRESS NOTES
Neurosurgery Update:    Patient back from angiogram still having severe headache w/ pressure behind L eye and sharp stabbing pain intermittently. Minimal relief from narcotic pain medication. Discussed w/ neurology team - they will see patient tomorrow  Will give 1gm solumedrol tonight  Plan for LP tomorrow w/ opening / closing pressure  Send for cell count, culture, glu, protein, bands, igg index, M/E panel (all ordered)  Placed orders for IR for tomorrow - may do at bedside if neurology team available.    NPO at midnight  Continue to hold heparin for LP tomorrow      MISSY Ricketts - CNP  10/06/22  4:44 PM

## 2022-10-06 NOTE — PLAN OF CARE
Problem: Discharge Planning  Goal: Discharge to home or other facility with appropriate resources  10/6/2022 0126 by Royer Garcia RN  Outcome: Progressing     Problem: Pain  Goal: Verbalizes/displays adequate comfort level or baseline comfort level  10/6/2022 0126 by Royer Garcia RN  Outcome: Progressing     Problem: Neurosensory - Adult  Goal: Achieves stable or improved neurological status  10/6/2022 0126 by Royer Garcia RN  Outcome: Progressing     Problem: Neurosensory - Adult  Goal: Achieves maximal functionality and self care  10/6/2022 0126 by Royer Garcia RN  Outcome: Progressing     Problem: ABCDS Injury Assessment  Goal: Absence of physical injury  10/6/2022 0126 by Royer Garcia RN  Outcome: Progressing     Continue with plan of care.

## 2022-10-06 NOTE — PROGRESS NOTES
Hospitalist Progress Note      PCP: No primary care provider on file. Date of Admission: 10/3/2022    Chief Complaint:   Worsening headache    Subjective:     Patient feels improved. He continues to experience left sided headaches but his pain has been more controlled. He denies visual changes, focal weakness, numbness, tingling, nausea, vomiting. Medications:  Reviewed    Infusion Medications    lactated ringers 100 mL/hr at 10/06/22 0032     Scheduled Medications    levETIRAcetam  500 mg Oral BID    gabapentin  100 mg Oral TID    atorvastatin  40 mg Oral Nightly     PRN Meds: oxyCODONE, sennosides-docusate sodium, acetaminophen, ondansetron **OR** ondansetron, meclizine      Intake/Output Summary (Last 24 hours) at 10/6/2022 0046  Last data filed at 10/5/2022 1650  Gross per 24 hour   Intake 480 ml   Output 1150 ml   Net -670 ml         Physical Exam Performed:    /78   Pulse 55   Temp 97.1 °F (36.2 °C) (Oral)   Resp 16   Ht 6' (1.829 m)   Wt 263 lb 6.4 oz (119.5 kg)   SpO2 100%   BMI 35.72 kg/m²       GEN alert, in no distress  HEENT normocephalic, anicteric sclera, EOMI, mucosa moist, no stridor  NECK supple, trachea midline  RESP on RA, in no distress, clear to auscultation  CARDS RRR, S1, S2, no murmurs, no edema, radial pulse 2+, DP pulse  ABD +BS, soft nontender  MSK no cyanosis, no clubbing  SKIN warm, dry  NEURO alert, oriented x 3, no facial asymmetry, no dysarthria, no protonator drift, UE and LE strength 5/5, gross sensory to light touch intact, finger to nose without dysmetria.   PSYCH normal mood        Labs:   Recent Labs     10/03/22  0721 10/04/22  0312 10/05/22  0641   WBC 9.6 11.4* 9.7   HGB 15.8 14.4 15.3   HCT 46.0 41.5 43.2    200 208       Recent Labs     10/03/22  0721 10/04/22  0337 10/05/22  0641    135* 137   K 4.5 4.8 4.7    100 102   CO2 26 22 25   BUN 16 15 11   CREATININE 0.9 0.8* 0.9   CALCIUM 9.5 8.9 9.2   PHOS  --   --  2.7 Recent Labs     10/03/22  0721 10/05/22  0641   AST 27 21   ALT 47* 36   BILITOT 0.7 0.7   ALKPHOS 113 110       Recent Labs     10/03/22  0914   INR 1.06       No results for input(s): Doyne Knock in the last 72 hours. Urinalysis:      Lab Results   Component Value Date/Time    NITRU Negative 02/13/2020 11:04 AM    BLOODU Negative 02/13/2020 11:04 AM    SPECGRAV 1.010 02/13/2020 11:04 AM    GLUCOSEU Negative 02/13/2020 11:04 AM       Radiology:  MRI BRAIN W WO CONTRAST   Final Result   1. Thin but fairly diffuse left convexity and tentorial subdural hematoma measuring up to 3 mm in thickness unchanged since previous noncontrast head CT. MRA BRAIN:      FINDINGS: The internal carotid arteries are patent and normal in caliber through the skull base. The middle cerebral and anterior cerebral arteries are patent and normal in caliber. The anterior communicating artery is normal. Posterior cerebral arteries    are patent bilaterally. The basilar artery is patent and normal in caliber. Distal vertebral arteries are patent and normal in caliber. IMPRESSION:   1. Normal MRA of the brain. MRV brain:      FINDINGS: The internal jugular veins, sigmoid sinuses, transverse sinuses, straight sinus, and superior sagittal sinus are widely patent and normal in appearance. IMPRESSION:   1. Normal MRV of the brain. MRV HEAD W WO CONTRAST   Final Result   1. Thin but fairly diffuse left convexity and tentorial subdural hematoma measuring up to 3 mm in thickness unchanged since previous noncontrast head CT. MRA BRAIN:      FINDINGS: The internal carotid arteries are patent and normal in caliber through the skull base. The middle cerebral and anterior cerebral arteries are patent and normal in caliber. The anterior communicating artery is normal. Posterior cerebral arteries    are patent bilaterally. The basilar artery is patent and normal in caliber.  Distal vertebral arteries are patent and normal in caliber. IMPRESSION:   1. Normal MRA of the brain. MRV brain:      FINDINGS: The internal jugular veins, sigmoid sinuses, transverse sinuses, straight sinus, and superior sagittal sinus are widely patent and normal in appearance. IMPRESSION:   1. Normal MRV of the brain. MRA HEAD WO CONTRAST   Final Result   1. Thin but fairly diffuse left convexity and tentorial subdural hematoma measuring up to 3 mm in thickness unchanged since previous noncontrast head CT. MRA BRAIN:      FINDINGS: The internal carotid arteries are patent and normal in caliber through the skull base. The middle cerebral and anterior cerebral arteries are patent and normal in caliber. The anterior communicating artery is normal. Posterior cerebral arteries    are patent bilaterally. The basilar artery is patent and normal in caliber. Distal vertebral arteries are patent and normal in caliber. IMPRESSION:   1. Normal MRA of the brain. MRV brain:      FINDINGS: The internal jugular veins, sigmoid sinuses, transverse sinuses, straight sinus, and superior sagittal sinus are widely patent and normal in appearance. IMPRESSION:   1. Normal MRV of the brain. IR ANGIOGRAM CAROTID CEREBRAL LEFT    (Results Pending)           Assessment/Plan:    Active Hospital Problems    Diagnosis Date Noted    Subarachnoid hemorrhage ([address] [I60.9] 10/03/2022     Priority: Medium    Subdural hematoma [S06. 5XAA] 10/03/2022     Priority: Medium     Spontaneous SDH and SAH  - CT Head wo contrast 10/3 showed acute left extra-axial hemorrhage. There is a small left frontotemporal subdural hematoma. Left temporal subarachnoid hemorrhage component is also present.  - CTA Head/Neck 10/3 showed no apparent arterial high grade stenosis, occlusion or aneurysm within the head or neck.   - repeat CT Head wo contrast 10/3/22 showed no significant interval change in left convexity and left tentorial subdural hemorrhage.  - MRA Head wo contrast 10/4/22 normal.  - MRV Head wwo contrast 10/4/22 normal.  - MRI wwo contrast 10/4/22 showed thin but fairly diffuse left convexity and tentorial subdural hematoma measuring up to 3 mm in thickness unchanged since previous noncontrast head CT.  - no history of trauma, fall.  - no history of hypertension.  - no history of easy bruising or bleeding.  - no history of drug use. - recent COVID19 infection one month ago and had a violent cough for 2 weeks which resolved 2 weeks ago. - UDS 10/4 positive for fentanyl and oxycodone, which patient is receiving inpatient.  - Received desmopressin at [facility].  - Started keppra 500 mg BID.  - Maintain SBP <160. Pt not hypertensive.   - Goal platelet > 762 K and INR < 1.4.  - Neuro checks Q4H. - neurosurgery plan to perform cerebral angiogram on Thursday. Headache  - in the setting of SAH, SDH.  - pain control. Dyslipidemia  - Tot chol 151, HDL 39, LDL 94, Trig 89 on 10/5/22.  - Continue home lipitor 40 mg HS. History of Hypothyroidism  - has not been taking levothyroxine for 6 weeks. Reportedly he was started for borderline function.  - TSH, free T4. History of Hypogonadism  - was taking testosterone for 2 years. Stopped about four years ago. DVT Prophylaxis: SCD  Diet: ADULT DIET; Regular  Diet NPO Exceptions are: Sips of Water with Meds  Code Status: Full Code    PT/OT Eval Status: patient able to ambulate. Dispo - once stable.     Homar Gastelum MD

## 2022-10-06 NOTE — CONSULTS
Clinical Pharmacy Progress Note    All IVs in NS - Management by Pharmacy    Consult Date(s): 10/3  Consulting Provider(s): Dr Karuna Sanchez / Plan  Acute SDH + SAH   - All IVs in Normal Saline  Drips will be adjusted to normal saline as appropriate based on compatibility, in an effort to avoid fluid shifts, as D5W is osmotically active. The following intermittent IV drips / infusions have been adjusted to saline:  None at this time  If ordered, the following medications must remain in D5W due to incompatibility with normal saline:  None at this time  Note: Patient may have dextrose ordered as part of hypoglycemia treatment protocol. Total IV fluid delivered to patient over last 24 hrs: None; just IV flushes  As patient is not being treated for pituitary tumor resection or requiring hypertonic saline (defined as >0.9% NaCl), pharmacy will sign off of IV review consult, per protocol.         Please call with questions--  Katalina Cohen PharmD, [facility]  Wireless: [phone]   10/6/2022 9:44 AM

## 2022-10-06 NOTE — PROGRESS NOTES
Pt transferred to PCU without issue. All pts belongings with sister, who is bedside. RN bedside at transfer.

## 2022-10-06 NOTE — PROGRESS NOTES
4 Eyes Skin Assessment     The patient is being assess for   Transfer to New Unit    I agree that 2 RN's have performed a thorough Head to Toe Skin Assessment on the patient. ALL assessment sites listed below have been assessed. Areas assessed for pressure by both nurses:   [x]   Head, Face, and Ears   [x]   Shoulders, Back, and Chest, Abdomen  [x]   Arms, Elbows, and Hands   [x]   Coccyx, Sacrum, and Ischium  [x]   Legs, Feet, and Heels        Skin Assessed Under all Medical Devices by both nurses:  NA               All Mepilex Borders were peeled back and area peeked at by both nurses:  No: NA  Please list where Mepilex Borders are located: NA             **SHARE this note so that the co-signing nurse is able to place an eSignature**    Co-signer eSignature: Electronically signed by Lillian Bagley RN on 10/5/22 at 9:45 PM EDT    Does the Patient have Skin Breakdown related to pressure?   No   Chao Prevention initiated:  NA   Wound Care Orders initiated:  NA      [facility] nurse consulted for Pressure Injury (Stage 3,4, Unstageable, DTI, NWPT, Complex wounds)and New or Established Ostomies:  NA      Primary Nurse eSignature: Electronically signed by Lori Alicia RN on 10/6/22 at 1:27 AM EDT

## 2022-10-06 NOTE — PROGRESS NOTES
NEUROSURGERY PROGRESS NOTE       Patient Name: Srinivasa Bernal YOB: 1975   Sex: Male Age: 56 yrs     CC / Reason for Consult: non-traumatic SAH/SDH    Changes over last 24 hours: None  Patient w/ worsening headache today  No clear trigger, headache was much better yesterday, seems to have gradually gotten worse this morning, feels like headache is now intolerable     ROS: no new complaints   HISTORY   Interval Hx:   Srinivasa Bernal is a 56 y.o. y/o male with no significant PMH who presents with progressively worsening headaches. He states headaches started several days ago after hearing a pop behind L eye, immediately he had a warm sensation over left side of face and blurry vision in the left eye, both symptoms resolved quickly and have not returned. Has since had a headache that started mild and consistently worsened each day, tried to take ASA for headache relief, but no relief obtained. He also started to get a more sharp stabbing pain to left side of face. He denies any weakness, dizziness, confusion. Denies having hx of headaches like this previously. PMH History reviewed. No pertinent past medical history.    Allergies No Known Allergies   Diet Diet NPO Exceptions are: Sips of Water with Meds   Isolation No active isolations     LABS   Metabolic Panel Recent Labs     10/04/22  0337 10/05/22  0641 10/06/22  0955   * 137 135*   K 4.8 4.7 3.8    102 101   CO2 22 25 25   BUN 15 11 11   CREATININE 0.8* 0.9 1.0   GLUCOSE 104* 102* 93   CALCIUM 8.9 9.2 9.2   LABALBU  --  4.5  --    PHOS  --  2.7  --    MG  --  2.50* 2.30   ALKPHOS  --  110  --    ALT  --  36  --    AST  --  21  --         CBC / Coags Recent Labs     10/04/22  0312 10/05/22  0641 10/06/22  0955   WBC 11.4* 9.7 8.7   RBC 4.53 4.79 4.75   HGB 14.4 15.3 15.0   HCT 41.5 43.2 43.0    208 212        Other Lab Results   Component Value Date/Time    LABA1C 5.1 10/04/2022 03:12 AM    LDLCALC 94 10/05/2022 06:41 AM    TRIG 89 10/05/2022 06:41 AM     No results found for: PHENYTOIN, KEPPRA, LACOSA, LAMO, VALPROATE, LACTSEPSIS, LACTA       CURRENT SCHEDULED MEDICATIONS   Inpatient Medications     gabapentin, 200 mg, Oral, TID    gabapentin, 100 mg, Oral, Once    [START ON 10/7/2022] sennosides-docusate sodium, 2 tablet, Oral, Daily    levETIRAcetam, 500 mg, Oral, BID    atorvastatin, 40 mg, Oral, Nightly   Infusions    lactated ringers 100 mL/hr at 10/06/22 1025      Antibiotics   Recent Abx Admin        No antibiotic orders with administrations found. DIAGNOSTICS   IMAGES:  No new imaging    PHYSICAL EXAMINATION     PHYSICAL EXAM:  Vitals:    10/06/22 0433 10/06/22 0503 10/06/22 0843 10/06/22 1147   BP:   (!) 137/91 132/72   Pulse:   55 (!) 49   Resp: 18 18 18 24   Temp:   97.4 °F (36.3 °C) 97.7 °F (36.5 °C)   TempSrc:   Oral Axillary   SpO2:       Weight:       Height:             Neurologic  Mental status:   Orientation to person, place, time, situation  Attention intact as able to attend well to the exam    Language fluent in conversation  Comprehension intact; follows simple commands     Cranial nerves:   Pupils equal   Gaze conjugate  EOM's intact  Face symmetric     Motor Exam:    R  L    Deltoid 5 5   Biceps 5 5   Triceps 5 5   Wrist extension  5 5   Interossei 5 5        R  L    Hip flexion  5 5   Hip extension  5 5   Knee flexion  5 5   Knee extension  5 5   Ankle dorsiflexion  5 5   Ankle plantar flexion  5 5      Sensory: light touch intact and symmetric in all 4 extremities     Tone: normal in all 4 extremities     Cardiovascular: Warm, appears well perfused   Respiratory: Easy, non-labored respiratory pattern   Abdominal: Abdomen is without distention   Extremities: Upper and lower extremities are atraumatic in appearance without deformity. ASSESSMENT & RECOMMENDATIONS   IMPRESSION:  Patient is a 55 y/o M w/ spontaneous SAH and SDH, concerning for possible vascular etiology. Patient denies any trauma. CTA / MRI/MRA negative. Worsening headache this morning      RECOMMENDATIONS:  -Head CT this morning prior to angiogram  -Diagnostic cerebral angiogram Thursday at 1300  -Neurologic exams frequency: Q4H  -Check ESR(pending). CRP elevated   -Keppra 500mg BID x 7 days total  -Maintain SBP <160; If PRN med insufficient after 30 minutes, start Nicardipine infusion  -Keep Plt >100k & INR <1.4  -Hold all anticoagulation & antiplatelet for 2 weeks (ok to give sq heparin for dvt prophylaxis)  -We will follow.        MISSY Oro - CNP   10/06/22  12:49 PM

## 2022-10-06 NOTE — BRIEF OP NOTE
Neurosurgery Brief Op Note    Date of Procedure: 10/6/2022     Pre-procedural Diagnosis: Spontaneous SDH    Post-procedural Diagnosis: Spontaneous SDH    Surgeon: Alvaro Art MD    Assistant: None    Procedure(s) Performed: Dx cervicocerebral angiography    Anesthesia: Conscious sedation    Procedure Details:See operative report    Findings: Unremarkable 5-vessel angiogram    Estimated Blood Loss: Minimal           Drains: None           Specimens: None    Implants: See implant log         Complications:  None apparent           Disposition: ICU            Condition: Stable      Roxanne Tapia MD.  [phone]

## 2022-10-06 NOTE — PROGRESS NOTES
Hospitalist Progress Note      PCP: No primary care provider on file. Date of Admission: 10/3/2022    Chief Complaint:   Worsening headache    Subjective:     Seen and examined patient at bedside. Sister at bedside. Patient AAOx4. But experiencing severe headache which worsens with light and sound. Sister giving all the information. Patient does want to talk. Added morphine IV for breakthrough headache. Waiting angiogram for further evaluation. Medications:  Reviewed    Infusion Medications    lactated ringers 100 mL/hr at 10/06/22 1025     Scheduled Medications    gabapentin  200 mg Oral TID    gabapentin  100 mg Oral Once    [START ON 10/7/2022] sennosides-docusate sodium  2 tablet Oral Daily    levETIRAcetam  500 mg Oral BID    atorvastatin  40 mg Oral Nightly     PRN Meds: morphine, oxyCODONE, acetaminophen, ondansetron **OR** ondansetron, meclizine      Intake/Output Summary (Last 24 hours) at 10/6/2022 1509  Last data filed at 10/6/2022 1147  Gross per 24 hour   Intake 0 ml   Output 200 ml   Net -200 ml       Physical Exam Performed:    /72   Pulse (!) 49   Temp 97.7 °F (36.5 °C) (Axillary)   Resp 24   Ht 6' (1.829 m)   Wt 263 lb 6.4 oz (119.5 kg)   SpO2 98%   BMI 35.72 kg/m²       GEN alert, in no distress  HEENT normocephalic, anicteric sclera, EOMI, mucosa moist, no stridor  NECK supple, trachea midline  RESP on RA, in no distress, clear to auscultation  CARDS RRR, S1, S2, no murmurs, no edema, radial pulse 2+, DP pulse  ABD +BS, soft nontender  MSK no cyanosis, no clubbing  SKIN warm, dry  NEURO alert, oriented x 3, no facial asymmetry, no dysarthria, no protonator drift, UE and LE strength 5/5, gross sensory to light touch intact, finger to nose without dysmetria.   PSYCH normal mood        Labs:   Recent Labs     10/04/22  0312 10/05/22  0641 10/06/22  0955   WBC 11.4* 9.7 8.7   HGB 14.4 15.3 15.0   HCT 41.5 43.2 43.0    208 212     Recent Labs     10/04/22  0337 10/05/22  0641 10/06/22  0955   * 137 135*   K 4.8 4.7 3.8    102 101   CO2 22 25 25   BUN 15 11 11   CREATININE 0.8* 0.9 1.0   CALCIUM 8.9 9.2 9.2   PHOS  --  2.7  --      Recent Labs     10/05/22  0641   AST 21   ALT 36   BILITOT 0.7   ALKPHOS 110     No results for input(s): INR in the last 72 hours. No results for input(s): Carie Macleod in the last 72 hours. Urinalysis:      Lab Results   Component Value Date/Time    NITRU Negative 02/13/2020 11:04 AM    BLOODU Negative 02/13/2020 11:04 AM    SPECGRAV 1.010 02/13/2020 11:04 AM    GLUCOSEU Negative 02/13/2020 11:04 AM       Radiology:  CT HEAD WO CONTRAST   Final Result      Stable appearance of a small left tentorial subdural hemorrhage. The left cerebral convexity component was better visualized on the prior study. No new mass effect. MRI BRAIN W WO CONTRAST   Final Result   1. Thin but fairly diffuse left convexity and tentorial subdural hematoma measuring up to 3 mm in thickness unchanged since previous noncontrast head CT. MRA BRAIN:      FINDINGS: The internal carotid arteries are patent and normal in caliber through the skull base. The middle cerebral and anterior cerebral arteries are patent and normal in caliber. The anterior communicating artery is normal. Posterior cerebral arteries    are patent bilaterally. The basilar artery is patent and normal in caliber. Distal vertebral arteries are patent and normal in caliber. IMPRESSION:   1. Normal MRA of the brain. MRV brain:      FINDINGS: The internal jugular veins, sigmoid sinuses, transverse sinuses, straight sinus, and superior sagittal sinus are widely patent and normal in appearance. IMPRESSION:   1. Normal MRV of the brain. MRV HEAD W WO CONTRAST   Final Result   1. Thin but fairly diffuse left convexity and tentorial subdural hematoma measuring up to 3 mm in thickness unchanged since previous noncontrast head CT.       MRA BRAIN: FINDINGS: The internal carotid arteries are patent and normal in caliber through the skull base. The middle cerebral and anterior cerebral arteries are patent and normal in caliber. The anterior communicating artery is normal. Posterior cerebral arteries    are patent bilaterally. The basilar artery is patent and normal in caliber. Distal vertebral arteries are patent and normal in caliber. IMPRESSION:   1. Normal MRA of the brain. MRV brain:      FINDINGS: The internal jugular veins, sigmoid sinuses, transverse sinuses, straight sinus, and superior sagittal sinus are widely patent and normal in appearance. IMPRESSION:   1. Normal MRV of the brain. MRA HEAD WO CONTRAST   Final Result   1. Thin but fairly diffuse left convexity and tentorial subdural hematoma measuring up to 3 mm in thickness unchanged since previous noncontrast head CT. MRA BRAIN:      FINDINGS: The internal carotid arteries are patent and normal in caliber through the skull base. The middle cerebral and anterior cerebral arteries are patent and normal in caliber. The anterior communicating artery is normal. Posterior cerebral arteries    are patent bilaterally. The basilar artery is patent and normal in caliber. Distal vertebral arteries are patent and normal in caliber. IMPRESSION:   1. Normal MRA of the brain. MRV brain:      FINDINGS: The internal jugular veins, sigmoid sinuses, transverse sinuses, straight sinus, and superior sagittal sinus are widely patent and normal in appearance. IMPRESSION:   1. Normal MRV of the brain. IR ANGIOGRAM CAROTID CEREBRAL LEFT    (Results Pending)           Assessment/Plan:    Active Hospital Problems    Diagnosis Date Noted    Subarachnoid hemorrhage ([facility], [place] [address] [I60.9] 10/03/2022     Priority: Medium    Subdural hematoma [S06. 5XAA] 10/03/2022     Priority: Medium     Spontaneous SDH and SAH  - CT Head wo contrast 10/3 showed acute left extra-axial hemorrhage.

## 2022-10-07 ENCOUNTER — APPOINTMENT (OUTPATIENT)
Dept: GENERAL RADIOLOGY | Age: 47
DRG: 066 | End: 2022-10-07
Attending: INTERNAL MEDICINE
Payer: COMMERCIAL

## 2022-10-07 LAB
ANION GAP SERPL CALCULATED.3IONS-SCNC: 17 MMOL/L (ref 3–16)
APPEARANCE CSF: CLEAR
BUN BLDV-MCNC: 15 MG/DL (ref 7–20)
CALCIUM SERPL-MCNC: 9.5 MG/DL (ref 8.3–10.6)
CHLORIDE BLD-SCNC: 100 MMOL/L (ref 99–110)
CLOT EVALUATION CSF: ABNORMAL
CO2: 19 MMOL/L (ref 21–32)
COLOR CSF: COLORLESS
CREAT SERPL-MCNC: 0.9 MG/DL (ref 0.9–1.3)
GFR AFRICAN AMERICAN: >60
GFR NON-AFRICAN AMERICAN: >60
GLUCOSE BLD-MCNC: 145 MG/DL (ref 70–99)
GLUCOSE, CSF: 82 MG/DL (ref 40–80)
HCT VFR BLD CALC: 46.4 % (ref 40.5–52.5)
HEMOGLOBIN: 16.2 G/DL (ref 13.5–17.5)
MCH RBC QN AUTO: 31.5 PG (ref 26–34)
MCHC RBC AUTO-ENTMCNC: 34.9 G/DL (ref 31–36)
MCV RBC AUTO: 90.2 FL (ref 80–100)
NO DIFFERENTIAL CSF: ABNORMAL
PDW BLD-RTO: 12.9 % (ref 12.4–15.4)
PLATELET # BLD: 251 K/UL (ref 135–450)
PMV BLD AUTO: 9.2 FL (ref 5–10.5)
POTASSIUM SERPL-SCNC: 4.1 MMOL/L (ref 3.5–5.1)
PROTEIN CSF: 45 MG/DL (ref 15–45)
RBC # BLD: 5.15 M/UL (ref 4.2–5.9)
RBC CSF: 4 /CUMM
SODIUM BLD-SCNC: 136 MMOL/L (ref 136–145)
WBC # BLD: 13.7 K/UL (ref 4–11)
WBC CSF: 1 /CUMM (ref 0–5)

## 2022-10-07 PROCEDURE — 6360000002 HC RX W HCPCS

## 2022-10-07 PROCEDURE — 6370000000 HC RX 637 (ALT 250 FOR IP): Performed by: NURSE PRACTITIONER

## 2022-10-07 PROCEDURE — 6370000000 HC RX 637 (ALT 250 FOR IP)

## 2022-10-07 PROCEDURE — 6370000000 HC RX 637 (ALT 250 FOR IP): Performed by: INTERNAL MEDICINE

## 2022-10-07 PROCEDURE — 2500000003 HC RX 250 WO HCPCS: Performed by: EMERGENCY MEDICINE

## 2022-10-07 PROCEDURE — 82042 OTHER SOURCE ALBUMIN QUAN EA: CPT

## 2022-10-07 PROCEDURE — 83916 OLIGOCLONAL BANDS: CPT

## 2022-10-07 PROCEDURE — 82945 GLUCOSE OTHER FLUID: CPT

## 2022-10-07 PROCEDURE — 62328 DX LMBR SPI PNXR W/FLUOR/CT: CPT

## 2022-10-07 PROCEDURE — 009U3ZZ DRAINAGE OF SPINAL CANAL, PERCUTANEOUS APPROACH: ICD-10-PCS | Performed by: RADIOLOGY

## 2022-10-07 PROCEDURE — 36415 COLL VENOUS BLD VENIPUNCTURE: CPT

## 2022-10-07 PROCEDURE — 87205 SMEAR GRAM STAIN: CPT

## 2022-10-07 PROCEDURE — 85027 COMPLETE CBC AUTOMATED: CPT

## 2022-10-07 PROCEDURE — 87070 CULTURE OTHR SPECIMN AEROBIC: CPT

## 2022-10-07 PROCEDURE — 80048 BASIC METABOLIC PNL TOTAL CA: CPT

## 2022-10-07 PROCEDURE — 2060000000 HC ICU INTERMEDIATE R&B

## 2022-10-07 PROCEDURE — 82784 ASSAY IGA/IGD/IGG/IGM EACH: CPT

## 2022-10-07 PROCEDURE — 84157 ASSAY OF PROTEIN OTHER: CPT

## 2022-10-07 PROCEDURE — 82040 ASSAY OF SERUM ALBUMIN: CPT

## 2022-10-07 PROCEDURE — 6360000002 HC RX W HCPCS: Performed by: INTERNAL MEDICINE

## 2022-10-07 PROCEDURE — 87483 CNS DNA AMP PROBE TYPE 12-25: CPT

## 2022-10-07 RX ORDER — METHYLPREDNISOLONE 4 MG/1
4 TABLET ORAL
Status: COMPLETED | OUTPATIENT
Start: 2022-10-08 | End: 2022-10-10

## 2022-10-07 RX ORDER — POLYETHYLENE GLYCOL 3350 17 G/17G
17 POWDER, FOR SOLUTION ORAL DAILY
Status: DISCONTINUED | OUTPATIENT
Start: 2022-10-07 | End: 2022-10-10 | Stop reason: HOSPADM

## 2022-10-07 RX ORDER — METHYLPREDNISOLONE 4 MG/1
4 TABLET ORAL
Status: COMPLETED | OUTPATIENT
Start: 2022-10-08 | End: 2022-10-09

## 2022-10-07 RX ORDER — METHYLPREDNISOLONE 4 MG/1
8 TABLET ORAL NIGHTLY
Status: COMPLETED | OUTPATIENT
Start: 2022-10-08 | End: 2022-10-08

## 2022-10-07 RX ORDER — METHYLPREDNISOLONE 4 MG/1
4 TABLET ORAL
Status: DISCONTINUED | OUTPATIENT
Start: 2022-10-08 | End: 2022-10-10 | Stop reason: HOSPADM

## 2022-10-07 RX ORDER — METHYLPREDNISOLONE 4 MG/1
24 TABLET ORAL ONCE
Status: COMPLETED | OUTPATIENT
Start: 2022-10-07 | End: 2022-10-07

## 2022-10-07 RX ORDER — LIDOCAINE HYDROCHLORIDE 10 MG/ML
5 INJECTION, SOLUTION EPIDURAL; INFILTRATION; INTRACAUDAL; PERINEURAL ONCE
Status: COMPLETED | OUTPATIENT
Start: 2022-10-07 | End: 2022-10-07

## 2022-10-07 RX ORDER — METHYLPREDNISOLONE 4 MG/1
4 TABLET ORAL NIGHTLY
Status: DISCONTINUED | OUTPATIENT
Start: 2022-10-09 | End: 2022-10-10 | Stop reason: HOSPADM

## 2022-10-07 RX ADMIN — OXYCODONE 10 MG: 5 TABLET ORAL at 08:33

## 2022-10-07 RX ADMIN — MORPHINE SULFATE 2 MG: 2 INJECTION, SOLUTION INTRAMUSCULAR; INTRAVENOUS at 05:48

## 2022-10-07 RX ADMIN — GABAPENTIN 200 MG: 100 CAPSULE ORAL at 14:50

## 2022-10-07 RX ADMIN — ACETAMINOPHEN 650 MG: 325 TABLET, FILM COATED ORAL at 20:42

## 2022-10-07 RX ADMIN — OXYCODONE 10 MG: 5 TABLET ORAL at 16:58

## 2022-10-07 RX ADMIN — LEVETIRACETAM 500 MG: 500 TABLET, FILM COATED ORAL at 20:42

## 2022-10-07 RX ADMIN — ACETAMINOPHEN 650 MG: 325 TABLET, FILM COATED ORAL at 03:08

## 2022-10-07 RX ADMIN — OXYCODONE 5 MG: 5 TABLET ORAL at 03:08

## 2022-10-07 RX ADMIN — ACETAMINOPHEN 650 MG: 325 TABLET, FILM COATED ORAL at 08:33

## 2022-10-07 RX ADMIN — GABAPENTIN 200 MG: 100 CAPSULE ORAL at 08:33

## 2022-10-07 RX ADMIN — SENNOSIDES AND DOCUSATE SODIUM 2 TABLET: 50; 8.6 TABLET ORAL at 08:32

## 2022-10-07 RX ADMIN — LIDOCAINE HYDROCHLORIDE 5 ML: 10 INJECTION, SOLUTION EPIDURAL; INFILTRATION; INTRACAUDAL; PERINEURAL at 10:19

## 2022-10-07 RX ADMIN — ACETAMINOPHEN 650 MG: 325 TABLET, FILM COATED ORAL at 12:41

## 2022-10-07 RX ADMIN — OXYCODONE 10 MG: 5 TABLET ORAL at 20:42

## 2022-10-07 RX ADMIN — ACETAMINOPHEN 650 MG: 325 TABLET, FILM COATED ORAL at 16:58

## 2022-10-07 RX ADMIN — OXYCODONE 10 MG: 5 TABLET ORAL at 12:41

## 2022-10-07 RX ADMIN — LORAZEPAM 0.5 MG: 0.5 TABLET ORAL at 05:09

## 2022-10-07 RX ADMIN — POLYETHYLENE GLYCOL 3350 17 G: 17 POWDER, FOR SOLUTION ORAL at 20:43

## 2022-10-07 RX ADMIN — LEVETIRACETAM 500 MG: 500 TABLET, FILM COATED ORAL at 08:33

## 2022-10-07 RX ADMIN — GABAPENTIN 200 MG: 100 CAPSULE ORAL at 20:42

## 2022-10-07 RX ADMIN — METHYLPREDNISOLONE 24 MG: 4 TABLET ORAL at 12:46

## 2022-10-07 ASSESSMENT — PAIN DESCRIPTION - DESCRIPTORS
DESCRIPTORS: ACHING
DESCRIPTORS: THROBBING
DESCRIPTORS: STABBING
DESCRIPTORS: STABBING

## 2022-10-07 ASSESSMENT — PAIN DESCRIPTION - ONSET
ONSET: ON-GOING
ONSET: ON-GOING

## 2022-10-07 ASSESSMENT — PAIN SCALES - GENERAL
PAINLEVEL_OUTOF10: 4
PAINLEVEL_OUTOF10: 5
PAINLEVEL_OUTOF10: 6
PAINLEVEL_OUTOF10: 4
PAINLEVEL_OUTOF10: 7

## 2022-10-07 ASSESSMENT — PAIN DESCRIPTION - PAIN TYPE
TYPE: ACUTE PAIN

## 2022-10-07 ASSESSMENT — PAIN - FUNCTIONAL ASSESSMENT
PAIN_FUNCTIONAL_ASSESSMENT: PREVENTS OR INTERFERES SOME ACTIVE ACTIVITIES AND ADLS
PAIN_FUNCTIONAL_ASSESSMENT: PREVENTS OR INTERFERES SOME ACTIVE ACTIVITIES AND ADLS

## 2022-10-07 ASSESSMENT — PAIN DESCRIPTION - FREQUENCY
FREQUENCY: CONTINUOUS

## 2022-10-07 ASSESSMENT — PAIN DESCRIPTION - LOCATION
LOCATION: HEAD

## 2022-10-07 ASSESSMENT — PAIN DESCRIPTION - ORIENTATION
ORIENTATION: LEFT

## 2022-10-07 NOTE — PROGRESS NOTES
Occupational Therapy  DC  Per conversation with RN, pt and Physical Therapist pt cont to be IND with his fx mobility and transfers, ADLs. Pt with no further acute OT needs. Encourage to cont walking with nursing staff. Will sign off. Please re-order if change in status.    Conchita Jenna, MOT, OTR/L

## 2022-10-07 NOTE — PROGRESS NOTES
NEUROLOGY / NEUROCRITICAL CARE PROGRESS NOTE       Patient Name: Raad Barth YOB: 1975   Sex: Male Age: 56 yrs     CC / Reason for Consult: Subarachnoid hemorrhage    Interval Hx / Changes over last 24 hours:   Reports his headache is significantly better this morning, and that when he takes pain medication it's actually working. ROS: +mild headache    HISTORY   Admission HPI:     Raad Barth is a 56 y.o. y/o male with PMH significant for TIA (2020) who [facility] to [facility] with complaints of a headache for 1 week. 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 Following this he had a mild 4/10, left sided headache. At that time, he denies any other symptoms such as persistent visual disturbance, dizziness, speech changes, numbness, tingling or lateralizing weakness. As the week went on, the left sided headache built in intensity, unrelieved by tylenol or NSAIDS. Coughing, sneezing, quick position changes made it worse. By the weekend, he rated the headache at a 8/10 and began having some nausea and fatigue. He started taking an aspirin daily to see if this would help the headache but it did not. By Monday, the headache continued to intensify and he was vomiting so he then decided to seek medical evaluation. His last dose of aspirin was yesterday morning. Upon arrival to the ED, he had a CT that showed an acute small left frontotemporal SDH and small left temporal SAH. CTA was unremarkable. Neurosurgery was consulted who recommended starting the patient on Keppra, blood pressure management and transfer to [facility] for neurosurgical evaluation. His vital signs were stable. Upon arrival to [facility] he was neurologically stable.  He had and MRI with and without contrast, MRV, and MRA that were all without any acute findings or abnormality. The patient reports a history of TIA back in 2020 when he had a sudden onset of left facial numbness that lasted for 6-8 hours. He went to ED and was worked up for stroke. He had an MRI and CTA at that time that was unremarkable. He was discharged and instructed to take 81mg aspirin and a statin which he admits he stopped taking a while ago. He denies a history of diabetes or hypertension and denies smoking, alcohol use or illicit drugs or recent head trauma. He states the weeks leading up to this event, he had COVID that was pretty severe. He was sick for a few weeks and had many intense coughing spells but otherwise cant think of anything else that might have caused this bleeding to happen. PMH History reviewed. No pertinent past medical history. Allergies No Known Allergies   Diet Diet NPO Exceptions are: Sips of Water with Meds   Isolation No active isolations     CURRENT SCHEDULED MEDICATIONS   Inpatient Medications     methylPREDNISolone, 24 mg, Oral, Once    [START ON 10/8/2022] methylPREDNISolone, 4 mg, Oral, QAM AC    [START ON 10/8/2022] methylPREDNISolone, 4 mg, Oral, Lunch    [START ON 10/8/2022] methylPREDNISolone, 4 mg, Oral, Dinner    [START ON 10/8/2022] methylPREDNISolone, 8 mg, Oral, Nightly    [START ON 10/9/2022] methylPREDNISolone, 4 mg, Oral, Nightly    gabapentin, 200 mg, Oral, TID    gabapentin, 100 mg, Oral, Once    sennosides-docusate sodium, 2 tablet, Oral, Daily    levETIRAcetam, 500 mg, Oral, BID    atorvastatin, 40 mg, Oral, Nightly   Infusions    lactated ringers 100 mL/hr at 10/06/22 1025      Antibiotics   Recent Abx Admin        No antibiotic orders with administrations found.                       LABS   Metabolic Panel Recent Labs     10/05/22  0641 10/06/22  0955 10/07/22  0540    135*  --    K 4.7 3.8  --     101  --    CO2 25 25 19*   BUN 11 11 15 CREATININE 0.9 1.0 0.9   GLUCOSE 102* 93 145*   CALCIUM 9.2 9.2 9.5   LABALBU 4.5  --   --    PHOS 2.7  --   --    MG 2.50* 2.30  --    ALKPHOS 110  --   --    ALT 36  --   --    AST 21  --   --       CBC / Coags Recent Labs     10/05/22  0641 10/06/22  0955 10/07/22  0540   WBC 9.7 8.7 13.7*   RBC 4.79 4.75 5.15   HGB 15.3 15.0 16.2   HCT 43.2 43.0 46.4    212 251      Other Recent Labs     10/05/22  0641   LDLCALC 94   TRIG 89     Protein, CSF 15 - 45 mg/dL 45      Glucose, CSF 40 - 80 mg/dL 82 High       Color, CSF Colorless Colorless    Appearance, CSF Clear Clear    Clot Evaluation CSF  see below    Comment: No Clots Seen   WBC, CSF 0 - 5 /cumm 1    RBC, CSF 0 /cumm 4 Abnormal          DIAGNOSTICS   IMAGES:  Images personally reviewed and agree w/ radiology interpretation. Head CT w/o Contrast:  Impression       Stable appearance of a small left tentorial subdural hemorrhage. The left cerebral convexity component was better visualized on the prior study. No new mass effect.        PHYSICAL EXAMINATION     PHYSICAL EXAM:  Vitals:    10/06/22 2330 10/07/22 0308 10/07/22 0548 10/07/22 1100   BP: 124/75 134/84 127/69 128/86   Pulse: 57 56 57 76   Resp: 16 18 18    Temp: 97.9 °F (36.6 °C) 97.8 °F (36.6 °C) 98.2 °F (36.8 °C) 97.9 °F (36.6 °C)   TempSrc: Axillary Oral Oral Oral   SpO2: 98% 94% 95%    Weight:       Height:             General: Alert, no distress, well-nourished  Neurologic  Mental status:   orientation to person, place, time, situation   Attention intact as able to attend well to the exam     Language fluent in conversation   Comprehension intact; follows simple commands    Cranial nerves:   CN2: Visual fields full w/o extinction on confrontational testing   CN 3,4,6: Pupils equal and reactive to light, extraocular muscles intact  CN5: Facial sensation symmetric   CN7: Face symmetric  CN8: Hearing symmetric to spoken voice  CN9: Palate elevated symmetrically  CN11: Traps full strength Call Neurology if any exam changes    Case was discussed with attending neurologist, MISSY Macias - [place]   Neurology & Neurocritical Care   10/7/2022 12:17 PM      ICU Patients:   [address] [phone]  [facility]: [facility] Neurocritical [facility]    Floor / PCU Patients:  Neurology Line: [phone]  [facility]: [facility] Neurology    I spent 25 minutes in the care of this patient. Over 50% of that time was in face-to-face counseling regarding disease process, diagnostic testing, preventative measures, and answering patient and family questions.

## 2022-10-07 NOTE — PROCEDURES
Neurointerventional Surgery Procedure Note    NAME: Aicha Rojas    : 1975    MRN: [id number]       HOSPITAL: [facility]    DATE OF PROCEDURE: 10/6/2022       PROCEDURE:   - Diagnostic cerebral angiogram  - Ultrasound-assisted percutaneous access of the right common femoral artery  - 3D rotational angiography of the right internal carotid artery with image reconstruction and analysis at a separate workstation  - 3D rotational angiography of the left internal carotid artery with image reconstruction and analysis at a   separate workstation  - 3D rotational angiography of the left vertebral artery with image reconstruction and analysis at a separate workstation  - 6-Nigerian Angioseal-assisted closure of the right common femoral artery    INDICATION(S): Mr. Marry Menezes is a 56 y.o. male  with spontaneous onset of headache approximately 1 week prior. Subsequently performed diagnostic imaging studies demonstrated a small supratentorial extra-axial hemorrhagic collection on the left side as well as a collection of subarachnoid hemorrhage within the anterior left temporal lobe. No definitive identification of the underlying etiology has occurred thus far, however. The patient presents now for further evaluation through catheter-based diagnostic cerebral angiography. OPERATORS:   Olman Bruno M.D., primary technician    SUPERVISION AND INTERPRETATION: Dr. Papito Savage personally performed the technical portions of the procedure with the assistance of the radiation technologist.  Following completion of the technical portions of the procedure, the angiographic images were reviewed at the neurography PACS work station by Dr. Papito Savage.      VESSELS CATHETERIZED & ANGIOGRAMMED:    Left common carotid artery  Left internal carotid artery  Left external carotid artery  Right common carotid artery  Right internal carotid artery  Right external carotid artery  Left vertebral artery  Right common femoral artery    ANESTHESIA: Prior to the procedure, the patient's personal and family history were reviewed and no contraindications identified. ASA: 3     Conscious sedation and intravenous analgesia was given by the radiology nurse under direct supervision of the attending physician for a period of 60 minutes. Local anesthesia was provided at the puncture site with 1% lidocaine with epinephrine. Monitored nursing care and medication records are available on the chart. MEDICATIONS GIVEN:   Fentanyl,IV   Versed,IV  Heparin, IV  1% lidocaine w/epinephrine, subcutaneous    RADIOCONTRAST: 140 mL Visipaque 320, IA    PROCEDURE TIME: 50 minutes     FLUOROSCOPY TIME: 14.7 minutes     RADIATION EXPOSURE: 718 mGy    ESTIMATED BLOOD LOSS: <50 mL    DEVICES USED:   Micropuncture kit. 5-Algerian sheath. 0.035\" Glidewire. 5-Algerian angled glide catheter. 6-Algerian Angioseal closure device. IMPLANT(S):  6-Algerian Angioseal closure device    CONSENT: Prior to the procedure, the technical aspect of the procedure as well as the potential risks and benefits were explained to the patient. Specifically, the risk of cerebral infarction, puncture site hemorrhage, femoral nerve injury, retroperitoneal hemorrhage, hemorrhagic shock, infection, device failure, anaphylaxis, renal failure, limb loss, death, radiation effects (hair loss, cataracts, radiation burns, tumors, dementia), arterial dissection, arterial pseudoaneurysms and A-V fistula were discussed. The advantages and disadvantages of alternative procedures were presented. An opportunity to state any questions or concerns was given and these were then addressed. Following this process, it was requested that we proceed with the proposed intervention and this was expressed in the form of a written consent.       COMPARISON: CT angiogram of the head and neck performed on October 3, 2022, MRI brain with and without contrast as well as MR angiography of the head without contrast performed on October 3, 1002    COMPLICATION(S): None apparent    DETAILS OF THE PROCEDURE: The patient was brought to the neuroangiography suite where the right groin was shaved, prepped and draped in usual sterile fashion. All elements of maximal sterile barrier technique and sterile ultrasound technique were used. The right common femoral artery was evaluated by ultrasound and found to be widely patent. The skin and subcutaneous tissues were anesthestized with 1% lidocaine with epinepherine. A micropuncture needle was advanced under ultrasound guidance and an image was stored to PACS. A guidewire was advanced into the aorta, over which a 5-Burundian arterial introducer sheath, with heparin drip was placed at the puncture site. Heparin bolus IV was given at this moment. Next, a 5-Burundian angled glide catheter was advanced over a Glidewire into the aortic arch under fluoroscopic guidance and used to selectively catheterize the vessels listed above. In each case, the vessel origin was visualized fluoroscopically by injection of contrast prior to selective catheterization. A multiple-view diagnostic cerebral angiogram was acquired at each of these catheterizations. In addition, 3D rotational angiography of the right internal carotid artery, the left internal carotid artery, and the left vertebral artery was performed and these images were reconstructed and analyzed at a separate workstation. After reviewing the images, the catheter was removed. The groin sheath was exchanged over a wire for a 6-Burundian Angio-Seal closure device and the arteriotomy was closed without difficulty. FINDINGS:       LEFT COMMON CAROTID ARTERY:  The left common carotid artery is widely patent. The left common carotid artery fills without defect or delay. There is no angiographic evidence of flow-limiting atherosclerotic narrowing within the left common carotid artery bifurcation.     LEFT INTERNAL CAROTID ARTERY: The origin of the left internal carotid artery is normal with no evidence of stenosis or atherosclerosis. The cervical, petrous, laceral, cavernous, and supraclinoidal left internal carotid artery is of normal course and caliber. There is opacification of the right anterior cerebral artery territory  through filling across a patent and well-developed anterior communicating artery complex. The left middle and anterior cerebral arteries fill normally. The left middle and anterior cerebral arteries have normal distribution to the cortical branches and the capillary and venous phases are normal.    LEFT EXTERNAL CAROTID ARTERY: The visualized portions of the left internal maxillary, middle meningeal, superficial temporal, and occipital arteries are all of normal caliber and distribution. No early venous filling is seen and there are no vascular malformations or lesions. RIGHT COMMON CAROTID ARTERY:  The right common carotid artery is widely patent. The right common carotid artery fills without defect or delay. There is no angiographic evidence of flow-limiting atherosclerotic narrowing within the right common carotid artery bifurcation. RIGHT INTERNAL CAROTID ARTERY: The cervical, petrous, laceral, cavernous, and supraclinoidal right internal carotid artery is of normal course and caliber. The right middle and anterior cerebral arteries fill normally. The right middle and anterior cerebral arteries have normal distribution to the cortical branches and the capillary and venous phases are normal.    RIGHT EXTERNAL CAROTID ARTERY: The visualized portions of the right internal maxillary, middle meningeal, superficial temporal, and occipital arteries are all of normal caliber and distribution. No early venous filling is seen and there are no vascular malformations or lesions. LEFT VERTEBRAL ARTERY: The left vertebral artery has a normal caliber as does the basilar artery.   There is opacification of the V4 segment of the right vertebral artery through reflux of contrast material into into the right vertebrobasilar junction. There is a right-sided AICA-PICA configuration. The left posterior inferior cerebellar artery, both anterior inferior cerebellar arteries, both superior cerebellar arteries and both posterior cerebral arteries fill normally. There is no capillary filling defect or filling delay. RIGHT COMMON FEMORAL ARTERY: The right common iliac, external iliac and common femoral arteries are normal caliber. The common femoral artery bifurcation appears normal. The groin sheath was inserted within the common femoral artery above the bifurcation without evidence of injury or thrombosis. IMPRESSION(S):  1. There is no angiographic evidence of aneurysm formation, arteriovenous malformation formation, or dural arteriovenous malformation formation within those divisions of the anterior and posterior circulations visualized during the current study. 2. Technically-successful deployment of a 6-Cuban Angioseal closure device within the right common femoral artery.

## 2022-10-07 NOTE — PROGRESS NOTES
Hospitalist Progress Note      PCP: No primary care provider on file. Date of Admission: 10/3/2022    Chief Complaint:   Worsening headache    Subjective:     Seen and examined patient at bedside. Sister at bedside. Patient AAOx4. He was feeling much better today. He received a dose of solumedrol last night. He had a smile on his face today. Angiogram yesterday unremarkable. Had LP this AM. Tolerated well. Medications:  Reviewed    Infusion Medications    lactated ringers 100 mL/hr at 10/06/22 1025     Scheduled Medications    [START ON 10/8/2022] methylPREDNISolone  4 mg Oral QAM AC    [START ON 10/8/2022] methylPREDNISolone  4 mg Oral Lunch    [START ON 10/8/2022] methylPREDNISolone  4 mg Oral Dinner    [START ON 10/8/2022] methylPREDNISolone  8 mg Oral Nightly    [START ON 10/9/2022] methylPREDNISolone  4 mg Oral Nightly    gabapentin  200 mg Oral TID    gabapentin  100 mg Oral Once    sennosides-docusate sodium  2 tablet Oral Daily    levETIRAcetam  500 mg Oral BID    atorvastatin  40 mg Oral Nightly     PRN Meds: LORazepam, oxyCODONE, naloxone, oxyCODONE, acetaminophen, ondansetron **OR** ondansetron, meclizine    No intake or output data in the 24 hours ending 10/07/22 7484    Physical Exam Performed:    BP (!) 164/89   Pulse 93   Temp 97.6 °F (36.4 °C) (Oral)   Resp 18   Ht 6' (1.829 m)   Wt 263 lb 6.4 oz (119.5 kg)   SpO2 95%   BMI 35.72 kg/m²       GEN alert, in no distress  HEENT normocephalic, anicteric sclera, EOMI, mucosa moist, no stridor  NECK supple, trachea midline  RESP on RA, in no distress, clear to auscultation  CARDS RRR, S1, S2, no murmurs, no edema, radial pulse 2+, DP pulse  ABD +BS, soft nontender  MSK no cyanosis, no clubbing  SKIN warm, dry, right groin mild bruising from angiogram yesterday, no obvious hematoma felt.  Mild tenderness  NEURO alert, oriented x 4, no facial asymmetry, no dysarthria, no protonator drift, UE and LE strength 5/5, gross sensory to light IMPRESSION:   1. Normal MRA of the brain. MRV brain:      FINDINGS: The internal jugular veins, sigmoid sinuses, transverse sinuses, straight sinus, and superior sagittal sinus are widely patent and normal in appearance. IMPRESSION:   1. Normal MRV of the brain. MRV HEAD W WO CONTRAST   Final Result   1. Thin but fairly diffuse left convexity and tentorial subdural hematoma measuring up to 3 mm in thickness unchanged since previous noncontrast head CT. MRA BRAIN:      FINDINGS: The internal carotid arteries are patent and normal in caliber through the skull base. The middle cerebral and anterior cerebral arteries are patent and normal in caliber. The anterior communicating artery is normal. Posterior cerebral arteries    are patent bilaterally. The basilar artery is patent and normal in caliber. Distal vertebral arteries are patent and normal in caliber. IMPRESSION:   1. Normal MRA of the brain. MRV brain:      FINDINGS: The internal jugular veins, sigmoid sinuses, transverse sinuses, straight sinus, and superior sagittal sinus are widely patent and normal in appearance. IMPRESSION:   1. Normal MRV of the brain. MRA HEAD WO CONTRAST   Final Result   1. Thin but fairly diffuse left convexity and tentorial subdural hematoma measuring up to 3 mm in thickness unchanged since previous noncontrast head CT. MRA BRAIN:      FINDINGS: The internal carotid arteries are patent and normal in caliber through the skull base. The middle cerebral and anterior cerebral arteries are patent and normal in caliber. The anterior communicating artery is normal. Posterior cerebral arteries    are patent bilaterally. The basilar artery is patent and normal in caliber. Distal vertebral arteries are patent and normal in caliber. IMPRESSION:   1. Normal MRA of the brain.       MRV brain:      FINDINGS: The internal jugular veins, sigmoid sinuses, transverse sinuses, straight sinus, and superior sagittal sinus are widely patent and normal in appearance. IMPRESSION:   1. Normal MRV of the brain. IR ANGIOGRAM CAROTID CEREBRAL LEFT    (Results Pending)           Assessment/Plan:    Active Hospital Problems    Diagnosis Date Noted    Subarachnoid hemorrhage ([address] [I60.9] 10/03/2022     Priority: Medium    Subdural hematoma [S06. 5XAA] 10/03/2022     Priority: Medium     Spontaneous SDH and SAH  - CT Head wo contrast 10/3 showed acute left extra-axial hemorrhage. There is a small left frontotemporal subdural hematoma. Left temporal subarachnoid hemorrhage component is also present.  - CTA Head/Neck 10/3 showed no apparent arterial high grade stenosis, occlusion or aneurysm within the head or neck. - repeat CT Head wo contrast 10/3/22 showed no significant interval change in left convexity and left tentorial subdural hemorrhage.  - MRA Head wo contrast 10/4/22 normal.  - MRV Head wwo contrast 10/4/22 normal.  - MRI wwo contrast 10/4/22 showed thin but fairly diffuse left convexity and tentorial subdural hematoma measuring up to 3 mm in thickness unchanged since previous noncontrast head CT.  - no history of trauma, fall.  - no history of hypertension.  - no history of easy bruising or bleeding.  - no history of drug use. - recent COVID19 infection one month ago and had a violent cough for 2 weeks which resolved 2 weeks ago. - UDS 10/4 positive for fentanyl and oxycodone, which patient is receiving inpatient.  - Received desmopressin at [facility]. - Continue keppra 500 mg BID x 7 days  - Maintain SBP <160. Pt not hypertensive.   - Goal platelet > 758 K and INR < 1.4.  - Neuro checks Q4H.  - Per neurosurgery, cerebral angiogram on 10/06/22 unremarkable  -    Intractable Headache  - in the setting of SAH, SDH however might have other etiology  -CPR 9.4  -Cerebral angiogram unremarkable  -Responding well to solumedrol from yesterday.  Taper per neurology  -LP done today (10/07/22) other lab per neurology for interpretation.  -started medrol dose pack today per neurology (10/07/22)  -Needs outpatient follow with neurology to better establish diagnosis/quantify headaches  - pain control. stopped IV pain meds    Dyslipidemia  - Tot chol 151, HDL 39, LDL 94, Trig 89 on 10/5/22.  - Continue home lipitor 40 mg HS. History of Hypothyroidism  - has not been taking levothyroxine for 6 weeks. Reportedly he was started for borderline function.  - TSH, free T4. History of Hypogonadism  - was taking testosterone for 2 years. Stopped about four years ago. DVT Prophylaxis: SCD  Diet: ADULT DIET; Regular  Code Status: Full Code    PT/OT Eval Status: patient able to ambulate.     Dispo - maybe tomorrow if headache under control with medro pack, and if neurology ok    Royal Robin MD

## 2022-10-07 NOTE — PROGRESS NOTES
4 Eyes Skin Assessment     NAME:  Adam Jo  YOB: 1975  MEDICAL RECORD NUMBER:  [id number]    The patient is being assess for  Transfer to New Unit    I agree that 2 RN's have performed a thorough Head to Toe Skin Assessment on the patient. ALL assessment sites listed below have been assessed. Areas assessed by both nurses:    Head, Face, Ears, Shoulders, Back, Chest, Arms, Elbows, Hands, Sacrum. Buttock, Coccyx, Ischium, and Legs. Feet and Heels        Does the Patient have a Wound?  No noted wound(s)       Chao Prevention initiated:  No   Wound Care Orders initiated:  No    Pressure Injury (Stage 3,4, Unstageable, DTI, NWPT, and Complex wounds) if present place referral/consult order under [de-identified] No    New and Established Ostomies if present place consult order under : No      Nurse 1 eSignature: Electronically signed by Taylor Spence RN on 10/7/22 at 12:07 AM EDT    **SHARE this note so that the co-signing nurse is able to place an eSignature**    Nurse 2 eSignature: Electronically signed by Gaby Brooks RN on 10/7/22 at 12:38 AM EDT

## 2022-10-07 NOTE — PROGRESS NOTES
Pt c/o 6/10 pain in head. PRN medications administered (see MAR). Pt transferred to [place] [address] room 7692. All belongings with patient and family at bedside.      Electronically signed by Marge Dickey RN on 10/6/2022 at 9:03 PM

## 2022-10-07 NOTE — PROGRESS NOTES
IMPRESSION:  Patient is a 57 y/o M w/ spontaneous SAH and SDH, concerning for possible vascular etiology. Patient denies any trauma. CTA / MRI/MRA negative. Angiography with Dr. Sasha Orozco yesterday unrevealing. RECOMMENDATIONS:  -Neurology following for headache and face pain  -Keppra 500mg BID x 7 days total  -Maintain SBP <160;  If PRN med insufficient after 30 minutes, start Nicardipine infusion  -Keep Plt >100k & INR <1.4  -Hold all anticoagulation & antiplatelet for 2 weeks (ok to give sq heparin for dvt prophylaxis)  -Follow-up with Dr. Juana Mccoy in 6 weeks, we will sign off      Derl Closs, APRN-[facility]  Neurosurgery   Neurosurgery Line: [phone]

## 2022-10-07 NOTE — PROGRESS NOTES
This RN attempted to obtain consent from pt regarding LP procedure but pt states that nobody has explained the procedure to him. The consent form was placed on his chart. Labels for LP also printed and placed on chart. 0930 - Transport came to  pt for LP.

## 2022-10-08 LAB
ANION GAP SERPL CALCULATED.3IONS-SCNC: 11 MMOL/L (ref 3–16)
BILIRUBIN URINE: NEGATIVE
BLOOD, URINE: NEGATIVE
BUN BLDV-MCNC: 23 MG/DL (ref 7–20)
C-REACTIVE PROTEIN: 7.7 MG/L (ref 0–5.1)
CALCIUM SERPL-MCNC: 9.2 MG/DL (ref 8.3–10.6)
CHLORIDE BLD-SCNC: 100 MMOL/L (ref 99–110)
CLARITY: CLEAR
CO2: 24 MMOL/L (ref 21–32)
COLOR: YELLOW
CREAT SERPL-MCNC: 1 MG/DL (ref 0.9–1.3)
GFR AFRICAN AMERICAN: >60
GFR NON-AFRICAN AMERICAN: >60
GLUCOSE BLD-MCNC: 149 MG/DL (ref 70–99)
GLUCOSE URINE: NEGATIVE MG/DL
HCT VFR BLD CALC: 43.1 % (ref 40.5–52.5)
HEMOGLOBIN: 14.8 G/DL (ref 13.5–17.5)
KETONES, URINE: NEGATIVE MG/DL
LACTIC ACID: 1.9 MMOL/L (ref 0.4–2)
LEUKOCYTE ESTERASE, URINE: NEGATIVE
MCH RBC QN AUTO: 31.5 PG (ref 26–34)
MCHC RBC AUTO-ENTMCNC: 34.4 G/DL (ref 31–36)
MCV RBC AUTO: 91.7 FL (ref 80–100)
MENINGITIS ENCEPHALITIS PANEL: NORMAL
MICROSCOPIC EXAMINATION: NORMAL
NITRITE, URINE: NEGATIVE
PDW BLD-RTO: 13 % (ref 12.4–15.4)
PH UA: 6 (ref 5–8)
PLATELET # BLD: 270 K/UL (ref 135–450)
PMV BLD AUTO: 9.4 FL (ref 5–10.5)
POTASSIUM SERPL-SCNC: 4.6 MMOL/L (ref 3.5–5.1)
PROCALCITONIN: 0.09 NG/ML (ref 0–0.15)
PROTEIN UA: NEGATIVE MG/DL
RBC # BLD: 4.7 M/UL (ref 4.2–5.9)
REPORT: NORMAL
SODIUM BLD-SCNC: 135 MMOL/L (ref 136–145)
SPECIFIC GRAVITY UA: >=1.03 (ref 1–1.03)
URINE REFLEX TO CULTURE: NORMAL
URINE TYPE: NORMAL
UROBILINOGEN, URINE: 1 E.U./DL
WBC # BLD: 24.2 K/UL (ref 4–11)

## 2022-10-08 PROCEDURE — 6370000000 HC RX 637 (ALT 250 FOR IP): Performed by: INTERNAL MEDICINE

## 2022-10-08 PROCEDURE — 85027 COMPLETE CBC AUTOMATED: CPT

## 2022-10-08 PROCEDURE — 36415 COLL VENOUS BLD VENIPUNCTURE: CPT

## 2022-10-08 PROCEDURE — 80048 BASIC METABOLIC PNL TOTAL CA: CPT

## 2022-10-08 PROCEDURE — 84145 PROCALCITONIN (PCT): CPT

## 2022-10-08 PROCEDURE — 6370000000 HC RX 637 (ALT 250 FOR IP): Performed by: NURSE PRACTITIONER

## 2022-10-08 PROCEDURE — 6360000002 HC RX W HCPCS

## 2022-10-08 PROCEDURE — 81003 URINALYSIS AUTO W/O SCOPE: CPT

## 2022-10-08 PROCEDURE — 6370000000 HC RX 637 (ALT 250 FOR IP)

## 2022-10-08 PROCEDURE — 86140 C-REACTIVE PROTEIN: CPT

## 2022-10-08 PROCEDURE — 2060000000 HC ICU INTERMEDIATE R&B

## 2022-10-08 PROCEDURE — 83605 ASSAY OF LACTIC ACID: CPT

## 2022-10-08 RX ADMIN — ACETAMINOPHEN 650 MG: 325 TABLET, FILM COATED ORAL at 10:47

## 2022-10-08 RX ADMIN — OXYCODONE 10 MG: 5 TABLET ORAL at 23:17

## 2022-10-08 RX ADMIN — OXYCODONE 10 MG: 5 TABLET ORAL at 15:16

## 2022-10-08 RX ADMIN — GABAPENTIN 200 MG: 100 CAPSULE ORAL at 09:40

## 2022-10-08 RX ADMIN — METHYLPREDNISOLONE 4 MG: 4 TABLET ORAL at 13:05

## 2022-10-08 RX ADMIN — ACETAMINOPHEN 650 MG: 325 TABLET, FILM COATED ORAL at 00:47

## 2022-10-08 RX ADMIN — LEVETIRACETAM 500 MG: 500 TABLET, FILM COATED ORAL at 09:40

## 2022-10-08 RX ADMIN — GABAPENTIN 200 MG: 100 CAPSULE ORAL at 13:49

## 2022-10-08 RX ADMIN — OXYCODONE 10 MG: 5 TABLET ORAL at 19:25

## 2022-10-08 RX ADMIN — ACETAMINOPHEN 650 MG: 325 TABLET, FILM COATED ORAL at 19:25

## 2022-10-08 RX ADMIN — METHYLPREDNISOLONE 8 MG: 4 TABLET ORAL at 22:20

## 2022-10-08 RX ADMIN — OXYCODONE 10 MG: 5 TABLET ORAL at 10:47

## 2022-10-08 RX ADMIN — METHYLPREDNISOLONE 4 MG: 4 TABLET ORAL at 17:53

## 2022-10-08 RX ADMIN — OXYCODONE 5 MG: 5 TABLET ORAL at 06:45

## 2022-10-08 RX ADMIN — LORAZEPAM 0.5 MG: 0.5 TABLET ORAL at 11:55

## 2022-10-08 RX ADMIN — ACETAMINOPHEN 650 MG: 325 TABLET, FILM COATED ORAL at 23:17

## 2022-10-08 RX ADMIN — LEVETIRACETAM 500 MG: 500 TABLET, FILM COATED ORAL at 20:37

## 2022-10-08 RX ADMIN — METHYLPREDNISOLONE 4 MG: 4 TABLET ORAL at 06:46

## 2022-10-08 RX ADMIN — OXYCODONE 10 MG: 5 TABLET ORAL at 00:47

## 2022-10-08 RX ADMIN — GABAPENTIN 200 MG: 100 CAPSULE ORAL at 20:37

## 2022-10-08 RX ADMIN — ACETAMINOPHEN 650 MG: 325 TABLET, FILM COATED ORAL at 06:45

## 2022-10-08 RX ADMIN — ACETAMINOPHEN 650 MG: 325 TABLET, FILM COATED ORAL at 15:15

## 2022-10-08 RX ADMIN — SENNOSIDES AND DOCUSATE SODIUM 2 TABLET: 50; 8.6 TABLET ORAL at 09:44

## 2022-10-08 ASSESSMENT — PAIN SCALES - GENERAL
PAINLEVEL_OUTOF10: 4
PAINLEVEL_OUTOF10: 6
PAINLEVEL_OUTOF10: 9
PAINLEVEL_OUTOF10: 7
PAINLEVEL_OUTOF10: 7
PAINLEVEL_OUTOF10: 0
PAINLEVEL_OUTOF10: 8
PAINLEVEL_OUTOF10: 3

## 2022-10-08 ASSESSMENT — PAIN DESCRIPTION - LOCATION
LOCATION: HEAD

## 2022-10-08 ASSESSMENT — PAIN DESCRIPTION - DESCRIPTORS
DESCRIPTORS: SORE;SHOOTING
DESCRIPTORS: ACHING
DESCRIPTORS: ACHING
DESCRIPTORS: SORE;STABBING

## 2022-10-08 ASSESSMENT — PAIN DESCRIPTION - PAIN TYPE
TYPE: ACUTE PAIN
TYPE: ACUTE PAIN

## 2022-10-08 ASSESSMENT — PAIN DESCRIPTION - FREQUENCY
FREQUENCY: CONTINUOUS
FREQUENCY: CONTINUOUS

## 2022-10-08 ASSESSMENT — PAIN DESCRIPTION - ORIENTATION
ORIENTATION: MID
ORIENTATION: POSTERIOR

## 2022-10-08 ASSESSMENT — PAIN DESCRIPTION - ONSET: ONSET: ON-GOING

## 2022-10-08 ASSESSMENT — PAIN - FUNCTIONAL ASSESSMENT
PAIN_FUNCTIONAL_ASSESSMENT: PREVENTS OR INTERFERES SOME ACTIVE ACTIVITIES AND ADLS

## 2022-10-08 ASSESSMENT — PAIN SCALES - WONG BAKER: WONGBAKER_NUMERICALRESPONSE: 0

## 2022-10-08 NOTE — PROGRESS NOTES
Pt slept well overnight. Neuro checks unchanged. Pt still has headache but is much more tolerable with taking the tylenol and oxycodone q4. Ambulates with sb assistance.  VSS

## 2022-10-08 NOTE — PROGRESS NOTES
NEUROLOGY / NEUROCRITICAL CARE PROGRESS NOTE       Patient Name: Adriano Hayes YOB: 1975   Sex: Male Age: 56 yrs     CC / Reason for Consult: SAH    Interval Hx / Changes over last 24 hours:   Patient exam largely unchanged. Headache remains unchanged from yesterday, patient states his best pain relief comes from the PRN oxycodone. ROS: C/o headache (wax and wane between dull ache and sharp stabbing pain) and intermittent photophobia    HISTORY   Admission HPI:   Adriano Hayes is a 56 y.o. y/o male with PMH significant for TIA (2020) who presented to [facility] on 10/3 with complaints of a headache for 1 week. 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 Following this he had a mild 4/10, left sided headache. At that time, he denies any other symptoms such as persistent visual disturbance, dizziness, speech changes, numbness, tingling or lateralizing weakness. As the week went on, the left sided headache built in intensity, unrelieved by tylenol or NSAIDS. Coughing, sneezing, quick position changes made it worse. By the weekend, he rated the headache at a 8/10 and began having some nausea and fatigue. He started taking an aspirin daily to see if this would help the headache but it did not. By Monday, the headache continued to intensify and he was vomiting so he then decided to seek medical evaluation. His last dose of aspirin was yesterday morning. Upon arrival to the ED, he had a CT that showed an acute small left frontotemporal SDH and small left temporal SAH. CTA was unremarkable. Neurosurgery was consulted who recommended starting the patient on Keppra, blood pressure management and transfer to [facility] for neurosurgical evaluation.  His vital signs were stable. Upon arrival to [facility] he was neurologically stable. He had and MRI with and without contrast, MRV, and MRA that were all without any acute findings or abnormality. The patient reports a history of TIA back in 2020 when he had a sudden onset of left facial numbness that lasted for 6-8 hours. He went to ED and was worked up for stroke. He had an MRI and CTA at that time that was unremarkable. He was discharged and instructed to take 81mg aspirin and a statin which he admits he stopped taking a while ago. He denies a history of diabetes or hypertension and denies smoking, alcohol use or illicit drugs or recent head trauma. He states the weeks leading up to this event, he had COVID that was pretty severe. He was sick for a few weeks and had many intense coughing spells but otherwise cant think of anything else that might have caused this bleeding to happen. PMH History reviewed. No pertinent past medical history. Allergies No Known Allergies   Diet ADULT DIET; Regular   Isolation No active isolations     CURRENT SCHEDULED MEDICATIONS   Inpatient Medications     methylPREDNISolone, 4 mg, Oral, QAM AC    methylPREDNISolone, 4 mg, Oral, Lunch    methylPREDNISolone, 4 mg, Oral, Dinner    methylPREDNISolone, 8 mg, Oral, Nightly    [START ON 10/9/2022] methylPREDNISolone, 4 mg, Oral, Nightly    polyethylene glycol, 17 g, Oral, Daily    gabapentin, 200 mg, Oral, TID    gabapentin, 100 mg, Oral, Once    sennosides-docusate sodium, 2 tablet, Oral, Daily    levETIRAcetam, 500 mg, Oral, BID    atorvastatin, 40 mg, Oral, Nightly   Infusions    lactated ringers 100 mL/hr at 10/06/22 1025      Antibiotics   Recent Abx Admin        No antibiotic orders with administrations found.                       LABS   Metabolic Panel Recent Labs     10/06/22  0955 10/07/22  0540 10/08/22  0558   * 136 135*   K 3.8 4.1 4.6    100 100   CO2 25 19* 24   BUN 11 15 23*   CREATININE 1.0 0.9 1.0   GLUCOSE 93 145* 149*   CALCIUM 9.2 9.5 9.2   MG 2.30  --   --       CBC / Coags Recent Labs     10/06/22  0955 10/07/22  0540 10/08/22  0558   WBC 8.7 13.7* 24.2*   RBC 4.75 5.15 4.70   HGB 15.0 16.2 14.8   HCT 43.0 46.4 43.1    251 270      Other No results for input(s): LABA1C, LDLCALC, TRIG, TSH, [id number], FOLATE, LABSALI, COVID19 in the last 72 hours. No results for input(s): PHENYTOIN, KEPPRA, LACOSA, LAMO, VALPROATE, LACTSEPSIS, LACTA in the last 72 hours. DIAGNOSTICS   IMAGES:  Previous imaging:  Head CT w/o Contrast:  Impression       Stable appearance of a small left tentorial subdural hemorrhage. The left cerebral convexity component was better visualized on the prior study. No new mass effect.        PHYSICAL EXAMINATION     PHYSICAL EXAM:  Vitals:    10/08/22 0030 10/08/22 0117 10/08/22 0306 10/08/22 0644   BP: (!) 103/54  (!) 95/57 (!) 102/59   Pulse: 62  54 57   Resp: 17 16 17 16   Temp: 97.8 °F (36.6 °C)  97.8 °F (36.6 °C) 97.7 °F (36.5 °C)   TempSrc: Oral  Oral Oral   SpO2: 95%  98% 96%   Weight:       Height:             General: Alert, no distress, well-nourished  Neurologic  Mental status:   orientation to person, place, time, situation   Attention intact as able to attend well to the exam     Language fluent in conversation   Comprehension intact; follows simple commands    Cranial nerves:   CN2: Visual fields full w/o extinction on confrontational testing   CN 3,4,6: Pupils equal and reactive to light, extraocular muscles intact  CN5: Facial sensation symmetric   CN7: Face symmetric  CN8: Hearing symmetric to spoken voice  CN9: Palate elevated symmetrically  CN11: Traps full strength on shoulder shrug  CN12: Tongue midline with protrusion    Motor Exam:   R  L    Deltoid 5  5   Biceps 5 5   Triceps 5 5   Wrist extension  5 5   Interossei 5 5      R  L    Hip flexion  5  5   Hip extension  5 5   Knee flexion  5 5   Knee extension  5 5   Ankle dorsiflexion  5 5   Ankle plantar flexion  5 5 Sensory: light touch intact and symmetric in all 4 extremities. No sensory extinction on bilateral simultaneous stimulation  Cerebellar/coordination: finger nose finger normal without ataxia  Tone: normal in all 4 extremities  Gait: intact      OTHER SYSTEMS:  Cardiovascular: Warm, appears well perfused   Respiratory: Easy, non-labored respiratory pattern   Abdominal: Abdomen is without distention   Extremities: Upper and lower extremities are atraumatic in appearance without deformity. No swelling or erythema. ASSESSMENT & RECOMMENDATIONS   Assessment:  Rachel Salas is a 55 yo male presents with severe headache, which is new for him, and has persisted with worsening over the last 8 days, found to have very small SDH and SAH in left frontotemporal lobe. It does not seem that size/degree of hemorrhage should explain the severe headache he has currently. Angiogram unremarkable. Headache improved with 1000 mg solumedrol. LP completed 10/07. Would be unusual for TAC syndromes because he does not have autonomic features. No suggestion of herpes zoster oticus. Elmarie  of symptoms not very consistent with trigeminal neuralgia. Not sure if elevated CRP represents inflammatory disease but no evidence of this in CNS based on scans, angiogram, and CSF (thus far). Plan:  - Continue medrol dose pack   - Limit opiate use to treat headaches, as able  - Continue Gabapentin  - Needs outpatient follow to better establish diagnosis/quantify headaches  - Maintain SBP < 160  -Keppra 500mg BID x 7 days total  -Hold all anticoagulation & antiplatelet for 2 weeks (ok to give sq heparin for dvt prophylaxis)  - Ok to discharge from neurology perspective.   Neurology will sign off, please call Neurology if any exam changes     Case was discussed with attending neurologist, Dr. Dennie Parkers, [facility] - [place]   Neurology & Neurocritical Care   10/8/2022 10:28 AM      ICU Patients:   Neurocritical Care Line: [phone]  [facility]: [facility] Neurocritical Care    Floor / PCU Patients:  Neurology Line: [phone]  [facility]: [facility] Neurology    I spent 35 minutes in the care of this patient. Over 50% of that time was in face-to-face counseling regarding disease process, diagnostic testing, preventative measures, and answering patient and family questions.

## 2022-10-08 NOTE — PLAN OF CARE
Problem: Discharge Planning  Goal: Discharge to home or other facility with appropriate resources  10/8/2022 0905 by Amber Carlin RN  Outcome: Progressing  Plan of care reviewed with pt. All questions answered at this time. Problem: ABCDS Injury Assessment  Goal: Absence of physical injury  10/8/2022 0905 by Amber Carlin RN  Outcome: Progressing  Fall precautions are in place: nonskid socks, call light in reach, bed is locked and in lowest position, bed alarm engaged. Remains free of falls.

## 2022-10-08 NOTE — PROGRESS NOTES
Hospitalist Progress Note      PCP: No primary care provider on file. Date of Admission: 10/3/2022    Chief Complaint:   Worsening headache    Subjective:     Seen and examined patient at bedside. Sister at bedside. Still has headache   Today WBC jumped to 24  He had LP 10/07 and CSF study so far looks okay  Discussed with neurology   Will hold on iv AB for now  Follow csf cx     Angiogram  unremarkable. Medications:  Reviewed    Infusion Medications    lactated ringers 100 mL/hr at 10/06/22 1025     Scheduled Medications    methylPREDNISolone  4 mg Oral QAM AC    methylPREDNISolone  4 mg Oral Lunch    methylPREDNISolone  4 mg Oral Dinner    methylPREDNISolone  8 mg Oral Nightly    [START ON 10/9/2022] methylPREDNISolone  4 mg Oral Nightly    polyethylene glycol  17 g Oral Daily    gabapentin  200 mg Oral TID    gabapentin  100 mg Oral Once    sennosides-docusate sodium  2 tablet Oral Daily    levETIRAcetam  500 mg Oral BID    atorvastatin  40 mg Oral Nightly     PRN Meds: LORazepam, oxyCODONE, naloxone, oxyCODONE, acetaminophen, ondansetron **OR** ondansetron, meclizine      Intake/Output Summary (Last 24 hours) at 10/8/2022 1059  Last data filed at 10/8/2022 0644  Gross per 24 hour   Intake 380 ml   Output --   Net 380 ml       Physical Exam Performed:    /82   Pulse 53   Temp 97.2 °F (36.2 °C) (Oral)   Resp 17   Ht 6' (1.829 m)   Wt 263 lb 6.4 oz (119.5 kg)   SpO2 98%   BMI 35.72 kg/m²       GEN alert, in no distress  HEENT normocephalic, anicteric sclera, EOMI, mucosa moist, no stridor  NECK supple, trachea midline  RESP on RA, in no distress, clear to auscultation  CARDS RRR, S1, S2, no murmurs, no edema, radial pulse 2+, DP pulse  ABD +BS, soft nontender  MSK no cyanosis, no clubbing  SKIN warm, dry, right groin mild bruising from angiogram yesterday, no obvious hematoma felt.  Mild tenderness  NEURO alert, oriented x 4, no facial asymmetry, no dysarthria, no protonator drift, UE and LE strength 5/5, gross sensory to light touch intact, finger to nose without dysmetria. PSYCH normal mood      Labs:   Recent Labs     10/06/22  0955 10/07/22  0540 10/08/22  0558   WBC 8.7 13.7* 24.2*   HGB 15.0 16.2 14.8   HCT 43.0 46.4 43.1    251 270       Recent Labs     10/06/22  0955 10/07/22  0540 10/08/22  0558   * 136 135*   K 3.8 4.1 4.6    100 100   CO2 25 19* 24   BUN 11 15 23*   CREATININE 1.0 0.9 1.0   CALCIUM 9.2 9.5 9.2       No results for input(s): AST, ALT, BILIDIR, BILITOT, ALKPHOS in the last 72 hours. No results for input(s): INR in the last 72 hours. No results for input(s): Tyler Marybel in the last 72 hours. Urinalysis:      Lab Results   Component Value Date/Time    NITRU Negative 02/13/2020 11:04 AM    BLOODU Negative 02/13/2020 11:04 AM    SPECGRAV 1.010 02/13/2020 11:04 AM    GLUCOSEU Negative 02/13/2020 11:04 AM       Radiology:  FL LUMBAR PUNCTURE DIAG   Final Result   1. Technically successful diagnostic lumbar puncture performed at the L2-L3 level as described. Opening pressure 18 cm of water with closing pressure 11 cm of water. These values are normal.      CT HEAD WO CONTRAST   Final Result      Stable appearance of a small left tentorial subdural hemorrhage. The left cerebral convexity component was better visualized on the prior study. No new mass effect. MRI BRAIN W WO CONTRAST   Final Result   1. Thin but fairly diffuse left convexity and tentorial subdural hematoma measuring up to 3 mm in thickness unchanged since previous noncontrast head CT. MRA BRAIN:      FINDINGS: The internal carotid arteries are patent and normal in caliber through the skull base. The middle cerebral and anterior cerebral arteries are patent and normal in caliber. The anterior communicating artery is normal. Posterior cerebral arteries    are patent bilaterally. The basilar artery is patent and normal in caliber.  Distal vertebral arteries are patent and normal in caliber. IMPRESSION:   1. Normal MRA of the brain. MRV brain:      FINDINGS: The internal jugular veins, sigmoid sinuses, transverse sinuses, straight sinus, and superior sagittal sinus are widely patent and normal in appearance. IMPRESSION:   1. Normal MRV of the brain. MRV HEAD W WO CONTRAST   Final Result   1. Thin but fairly diffuse left convexity and tentorial subdural hematoma measuring up to 3 mm in thickness unchanged since previous noncontrast head CT. MRA BRAIN:      FINDINGS: The internal carotid arteries are patent and normal in caliber through the skull base. The middle cerebral and anterior cerebral arteries are patent and normal in caliber. The anterior communicating artery is normal. Posterior cerebral arteries    are patent bilaterally. The basilar artery is patent and normal in caliber. Distal vertebral arteries are patent and normal in caliber. IMPRESSION:   1. Normal MRA of the brain. MRV brain:      FINDINGS: The internal jugular veins, sigmoid sinuses, transverse sinuses, straight sinus, and superior sagittal sinus are widely patent and normal in appearance. IMPRESSION:   1. Normal MRV of the brain. MRA HEAD WO CONTRAST   Final Result   1. Thin but fairly diffuse left convexity and tentorial subdural hematoma measuring up to 3 mm in thickness unchanged since previous noncontrast head CT. MRA BRAIN:      FINDINGS: The internal carotid arteries are patent and normal in caliber through the skull base. The middle cerebral and anterior cerebral arteries are patent and normal in caliber. The anterior communicating artery is normal. Posterior cerebral arteries    are patent bilaterally. The basilar artery is patent and normal in caliber. Distal vertebral arteries are patent and normal in caliber. IMPRESSION:   1. Normal MRA of the brain.       MRV brain:      FINDINGS: The internal jugular veins, sigmoid sinuses, transverse sinuses, straight sinus, and superior sagittal sinus are widely patent and normal in appearance. IMPRESSION:   1. Normal MRV of the brain. IR ANGIOGRAM CAROTID CEREBRAL LEFT    (Results Pending)           Assessment/Plan:    Active Hospital Problems    Diagnosis Date Noted    Subarachnoid hemorrhage ([address] [I60.9] 10/03/2022     Priority: Medium    Subdural hematoma [S06. 5XAA] 10/03/2022     Priority: Medium     Spontaneous SDH and SAH  - CT Head wo contrast 10/3 showed acute left extra-axial hemorrhage. There is a small left frontotemporal subdural hematoma. Left temporal subarachnoid hemorrhage component is also present.  - CTA Head/Neck 10/3 showed no apparent arterial high grade stenosis, occlusion or aneurysm within the head or neck. - repeat CT Head wo contrast 10/3/22 showed no significant interval change in left convexity and left tentorial subdural hemorrhage.  - MRA Head wo contrast 10/4/22 normal.  - MRV Head wwo contrast 10/4/22 normal.  - MRI wwo contrast 10/4/22 showed thin but fairly diffuse left convexity and tentorial subdural hematoma measuring up to 3 mm in thickness unchanged since previous noncontrast head CT.  - no history of trauma, fall.  - no history of hypertension.  - no history of easy bruising or bleeding.  - no history of drug use. - recent COVID19 infection one month ago and had a violent cough for 2 weeks which resolved 2 weeks ago. - UDS 10/4 positive for fentanyl and oxycodone, which patient is receiving inpatient.  - Received desmopressin at [facility]. - Continue keppra 500 mg BID x 7 days  - Maintain SBP <160. Pt not hypertensive.   - Goal platelet > 713 K and INR < 1.4.  - Neuro checks Q4H.  - Per neurosurgery, cerebral angiogram on 10/06/22 unremarkable    10/8  Still has headache   Today WBC jumped to 24, could be due to steroid ?   He had LP 10/07 and CSF study so far looks okay  Discussed with neurology   Will hold on iv AB for now  Follow csf cx   Angiogram unremarkable  -    Intractable Headache  - in the setting of SAH, SDH however might have other etiology  -CPR 9.4  -Cerebral angiogram unremarkable  -Responding well to solumedrol from yesterday. Taper per neurology  -LP done today (10/07/22) other lab per neurology for interpretation.  -started medrol dose pack today per neurology (10/07/22)  -Needs outpatient follow with neurology to better establish diagnosis/quantify headaches  - pain control. stopped IV pain meds    Dyslipidemia  - Tot chol 151, HDL 39, LDL 94, Trig 89 on 10/5/22.  - Continue home lipitor 40 mg HS. History of Hypothyroidism  - has not been taking levothyroxine for 6 weeks. Reportedly he was started for borderline function. - check TSH, free T4, still pending     History of Hypogonadism  - was taking testosterone for 2 years. Stopped about four years ago. DVT Prophylaxis: SCD  Diet: ADULT DIET; Regular  Code Status: Full Code    PT/OT Eval Status: patient able to ambulate.     Dispo - maybe tomorrow if headache under control and leukocytosis resolved and no concern about infection     [place] MD

## 2022-10-08 NOTE — PLAN OF CARE
Problem: Discharge Planning  Goal: Discharge to home or other facility with appropriate resources  10/8/2022 0905 by Zander Healy RN  Outcome: Progressing     Problem: Pain  Goal: Verbalizes/displays adequate comfort level or baseline comfort level  Outcome: Progressing     Problem: Neurosensory - Adult  Goal: Achieves stable or improved neurological status  Outcome: Progressing     Problem: ABCDS Injury Assessment  Goal: Absence of physical injury  10/8/2022 0905 by Zander Helay RN  Outcome: Progressing

## 2022-10-08 NOTE — PLAN OF CARE
Problem: Pain  Goal: Verbalizes/displays adequate comfort level or baseline comfort level  Outcome: Progressing     Problem: Safety - Adult  Goal: Free from fall injury  Outcome: Progressing     Problem: Neurosensory - Adult  Goal: Achieves stable or improved neurological status  Outcome: Progressing

## 2022-10-08 NOTE — PROGRESS NOTES
Pt A&Ox4, VSS on room air. Pain to head being treated with PRN pain meds per [facility]. Pt up as tolerated, voiding adequately via BRP x1 SBA. Pt tolerating fluids and diet as expected. Pt angio site on R thigh CDI with no redness or drainage. Pt has had family at bedside most shift. Pt denies further needs. Bed alarm education provided to patient. Call light within reach. Standard safety measures in place. Will continue to monitor. Samantha Yu D/C'jesús.  Discharge instructions including the importance of hydration, the use of OTC medications, informations on kidney stone and the proper follow up recommendations have been provided to the patient/family. New medication, norco reviewed with patient. Narcotic consent reviewed and signed.  Return precautions given. Questions answered. Verbalized understanding. Pt walked out of ER with family. Pt in NAD, alert and oriented

## 2022-10-09 LAB
A/G RATIO: 1.5 (ref 1.1–2.2)
ALBUMIN CSF: 29.3 MG/DL (ref 10–30)
ALBUMIN SERPL-MCNC: 3748 MG/DL (ref 3400–5000)
ALBUMIN SERPL-MCNC: 4.2 G/DL (ref 3.4–5)
ALP BLD-CCNC: 114 U/L (ref 40–129)
ALT SERPL-CCNC: 39 U/L (ref 10–40)
ANION GAP SERPL CALCULATED.3IONS-SCNC: 10 MMOL/L (ref 3–16)
AST SERPL-CCNC: 17 U/L (ref 15–37)
BASOPHILS ABSOLUTE: 0 K/UL (ref 0–0.2)
BASOPHILS RELATIVE PERCENT: 0.3 %
BILIRUB SERPL-MCNC: 0.4 MG/DL (ref 0–1)
BUN BLDV-MCNC: 18 MG/DL (ref 7–20)
CALCIUM SERPL-MCNC: 9.1 MG/DL (ref 8.3–10.6)
CHLORIDE BLD-SCNC: 99 MMOL/L (ref 99–110)
CO2: 26 MMOL/L (ref 21–32)
CREAT SERPL-MCNC: 0.9 MG/DL (ref 0.9–1.3)
EOSINOPHILS ABSOLUTE: 0 K/UL (ref 0–0.6)
EOSINOPHILS RELATIVE PERCENT: 0 %
GFR AFRICAN AMERICAN: >60
GFR NON-AFRICAN AMERICAN: >60
GLUCOSE BLD-MCNC: 111 MG/DL (ref 70–99)
HCT VFR BLD CALC: 42.2 % (ref 40.5–52.5)
HEMOGLOBIN: 14.6 G/DL (ref 13.5–17.5)
IGG CSF: 3.9 MG/DL (ref 0–6)
IGG INDEX: 0.48 (ref 0.2–0.7)
IGG SYNTHESIS RATE CSF: -2.6 MG/DAY (ref -9.9–3.3)
IGG: 1050 MG/DL (ref 700–1600)
LYMPHOCYTES ABSOLUTE: 2 K/UL (ref 1–5.1)
LYMPHOCYTES RELATIVE PERCENT: 14.4 %
MCH RBC QN AUTO: 31.4 PG (ref 26–34)
MCHC RBC AUTO-ENTMCNC: 34.5 G/DL (ref 31–36)
MCV RBC AUTO: 91 FL (ref 80–100)
MONOCYTES ABSOLUTE: 0.7 K/UL (ref 0–1.3)
MONOCYTES RELATIVE PERCENT: 4.9 %
NEUTROPHILS ABSOLUTE: 11.1 K/UL (ref 1.7–7.7)
NEUTROPHILS RELATIVE PERCENT: 80.4 %
PDW BLD-RTO: 13 % (ref 12.4–15.4)
PLATELET # BLD: 234 K/UL (ref 135–450)
PMV BLD AUTO: 9.3 FL (ref 5–10.5)
POTASSIUM SERPL-SCNC: 4.1 MMOL/L (ref 3.5–5.1)
RBC # BLD: 4.64 M/UL (ref 4.2–5.9)
SODIUM BLD-SCNC: 135 MMOL/L (ref 136–145)
TOTAL PROTEIN: 7 G/DL (ref 6.4–8.2)
WBC # BLD: 13.8 K/UL (ref 4–11)

## 2022-10-09 PROCEDURE — 80053 COMPREHEN METABOLIC PANEL: CPT

## 2022-10-09 PROCEDURE — 6370000000 HC RX 637 (ALT 250 FOR IP)

## 2022-10-09 PROCEDURE — 6370000000 HC RX 637 (ALT 250 FOR IP): Performed by: INTERNAL MEDICINE

## 2022-10-09 PROCEDURE — 2060000000 HC ICU INTERMEDIATE R&B

## 2022-10-09 PROCEDURE — 6360000002 HC RX W HCPCS

## 2022-10-09 PROCEDURE — 6370000000 HC RX 637 (ALT 250 FOR IP): Performed by: NURSE PRACTITIONER

## 2022-10-09 PROCEDURE — 85025 COMPLETE CBC W/AUTO DIFF WBC: CPT

## 2022-10-09 PROCEDURE — 36415 COLL VENOUS BLD VENIPUNCTURE: CPT

## 2022-10-09 RX ADMIN — LEVETIRACETAM 500 MG: 500 TABLET, FILM COATED ORAL at 20:57

## 2022-10-09 RX ADMIN — OXYCODONE 10 MG: 5 TABLET ORAL at 04:27

## 2022-10-09 RX ADMIN — ACETAMINOPHEN 650 MG: 325 TABLET, FILM COATED ORAL at 13:06

## 2022-10-09 RX ADMIN — SENNOSIDES AND DOCUSATE SODIUM 2 TABLET: 50; 8.6 TABLET ORAL at 08:58

## 2022-10-09 RX ADMIN — METHYLPREDNISOLONE 4 MG: 4 TABLET ORAL at 17:15

## 2022-10-09 RX ADMIN — LORAZEPAM 0.5 MG: 0.5 TABLET ORAL at 20:57

## 2022-10-09 RX ADMIN — METHYLPREDNISOLONE 4 MG: 4 TABLET ORAL at 09:07

## 2022-10-09 RX ADMIN — LEVETIRACETAM 500 MG: 500 TABLET, FILM COATED ORAL at 08:58

## 2022-10-09 RX ADMIN — ACETAMINOPHEN 650 MG: 325 TABLET, FILM COATED ORAL at 08:57

## 2022-10-09 RX ADMIN — GABAPENTIN 200 MG: 100 CAPSULE ORAL at 20:57

## 2022-10-09 RX ADMIN — LORAZEPAM 0.5 MG: 0.5 TABLET ORAL at 14:41

## 2022-10-09 RX ADMIN — METHYLPREDNISOLONE 4 MG: 4 TABLET ORAL at 13:07

## 2022-10-09 RX ADMIN — OXYCODONE 10 MG: 5 TABLET ORAL at 17:15

## 2022-10-09 RX ADMIN — ACETAMINOPHEN 650 MG: 325 TABLET, FILM COATED ORAL at 04:27

## 2022-10-09 RX ADMIN — OXYCODONE 10 MG: 5 TABLET ORAL at 20:57

## 2022-10-09 RX ADMIN — GABAPENTIN 200 MG: 100 CAPSULE ORAL at 08:58

## 2022-10-09 RX ADMIN — GABAPENTIN 200 MG: 100 CAPSULE ORAL at 13:08

## 2022-10-09 RX ADMIN — OXYCODONE 10 MG: 5 TABLET ORAL at 08:57

## 2022-10-09 RX ADMIN — ACETAMINOPHEN 650 MG: 325 TABLET, FILM COATED ORAL at 20:58

## 2022-10-09 RX ADMIN — OXYCODONE 10 MG: 5 TABLET ORAL at 13:06

## 2022-10-09 RX ADMIN — ACETAMINOPHEN 650 MG: 325 TABLET, FILM COATED ORAL at 17:14

## 2022-10-09 RX ADMIN — METHYLPREDNISOLONE 4 MG: 4 TABLET ORAL at 20:58

## 2022-10-09 ASSESSMENT — PAIN SCALES - GENERAL
PAINLEVEL_OUTOF10: 0
PAINLEVEL_OUTOF10: 8
PAINLEVEL_OUTOF10: 0
PAINLEVEL_OUTOF10: 7
PAINLEVEL_OUTOF10: 7
PAINLEVEL_OUTOF10: 0
PAINLEVEL_OUTOF10: 9
PAINLEVEL_OUTOF10: 7

## 2022-10-09 ASSESSMENT — PAIN DESCRIPTION - LOCATION
LOCATION: HEAD

## 2022-10-09 ASSESSMENT — PAIN DESCRIPTION - FREQUENCY
FREQUENCY: CONTINUOUS
FREQUENCY: CONTINUOUS

## 2022-10-09 ASSESSMENT — PAIN DESCRIPTION - PAIN TYPE
TYPE: ACUTE PAIN

## 2022-10-09 ASSESSMENT — PAIN DESCRIPTION - ONSET
ONSET: ON-GOING

## 2022-10-09 ASSESSMENT — PAIN DESCRIPTION - ORIENTATION
ORIENTATION: LEFT
ORIENTATION: POSTERIOR

## 2022-10-09 ASSESSMENT — PAIN - FUNCTIONAL ASSESSMENT
PAIN_FUNCTIONAL_ASSESSMENT: PREVENTS OR INTERFERES SOME ACTIVE ACTIVITIES AND ADLS

## 2022-10-09 ASSESSMENT — PAIN DESCRIPTION - DESCRIPTORS
DESCRIPTORS: STABBING;SHARP
DESCRIPTORS: SHOOTING;SHARP
DESCRIPTORS: SHOOTING;SHARP
DESCRIPTORS: ACHING

## 2022-10-09 NOTE — PROGRESS NOTES
Pt is A&O, VSS, c/o headache, pain is being managed with pain medicine per MAR. Ambulated in [facility] well. All fall precaution is in place. Call light is within the reach.

## 2022-10-09 NOTE — PLAN OF CARE
Problem: Discharge Planning  Goal: Discharge to home or other facility with appropriate resources  Outcome: Progressing     Problem: Pain  Goal: Verbalizes/displays adequate comfort level or baseline comfort level  10/9/2022 0759 by Karli Sosa RN  Outcome: Progressing     Problem: Neurosensory - Adult  Goal: Achieves stable or improved neurological status  10/9/2022 0759 by Karli Sosa RN  Outcome: Progressing     Problem: Neurosensory - Adult  Goal: Achieves maximal functionality and self care  Outcome: Progressing     Problem: Safety - Adult  Goal: Free from fall injury  Outcome: Progressing   Fall risk precaution in place. Bed is locked and in lowest position. 2/4 side rails are up. Call light with in reach. Fall risk bracelet in place, non slip socks on.frequent check on patient. free from falls at this time. will continue to monitor.       Problem: ABCDS Injury Assessment  Goal: Absence of physical injury  Outcome: Progressing

## 2022-10-09 NOTE — PROGRESS NOTES
Hospitalist Progress Note      PCP: No primary care provider on file. Date of Admission: 10/3/2022    Chief Complaint:   Worsening headache    Subjective:     Seen and examined patient at bedside. Sister at bedside. Still has headache   WBC trending down to 13  He had LP 10/07 and CSF study so far looks okay  Csf culture negative so far   Discussed with neurology   Will hold on iv AB for now  Follow csf cx     Angiogram  unremarkable. Medications:  Reviewed    Infusion Medications    lactated ringers 100 mL/hr at 10/06/22 1025     Scheduled Medications    methylPREDNISolone  4 mg Oral QAM AC    methylPREDNISolone  4 mg Oral Lunch    methylPREDNISolone  4 mg Oral Dinner    methylPREDNISolone  4 mg Oral Nightly    polyethylene glycol  17 g Oral Daily    gabapentin  200 mg Oral TID    gabapentin  100 mg Oral Once    sennosides-docusate sodium  2 tablet Oral Daily    levETIRAcetam  500 mg Oral BID    atorvastatin  40 mg Oral Nightly     PRN Meds: LORazepam, oxyCODONE, naloxone, oxyCODONE, acetaminophen, ondansetron **OR** ondansetron, meclizine      Intake/Output Summary (Last 24 hours) at 10/9/2022 1044  Last data filed at 10/8/2022 1516  Gross per 24 hour   Intake 360 ml   Output 500 ml   Net -140 ml         Physical Exam Performed:    /73   Pulse 68   Temp 98 °F (36.7 °C) (Axillary)   Resp 18   Ht 6' (1.829 m)   Wt 263 lb 6.4 oz (119.5 kg)   SpO2 96%   BMI 35.72 kg/m²       GEN alert, in no distress  HEENT normocephalic, anicteric sclera, EOMI, mucosa moist, no stridor  NECK supple, trachea midline  RESP on RA, in no distress, clear to auscultation  CARDS RRR, S1, S2, no murmurs, no edema, radial pulse 2+, DP pulse  ABD +BS, soft nontender  MSK no cyanosis, no clubbing  SKIN warm, dry, right groin mild bruising from angiogram yesterday, no obvious hematoma felt.  Mild tenderness  NEURO alert, oriented x 4, no facial asymmetry, no dysarthria, no protonator drift, UE and LE strength 5/5, gross sensory to light touch intact, finger to nose without dysmetria. PSYCH normal mood      Labs:   Recent Labs     10/07/22  0540 10/08/22  0558 10/09/22  0928   WBC 13.7* 24.2* 13.8*   HGB 16.2 14.8 14.6   HCT 46.4 43.1 42.2    270 234       Recent Labs     10/07/22  0540 10/08/22  0558 10/09/22  0928    135* 135*   K 4.1 4.6 4.1    100 99   CO2 19* 24 26   BUN 15 23* 18   CREATININE 0.9 1.0 0.9   CALCIUM 9.5 9.2 9.1       Recent Labs     10/09/22  0928   AST 17   ALT 39   BILITOT 0.4   ALKPHOS 114       No results for input(s): INR in the last 72 hours. No results for input(s): Angeline Coombes in the last 72 hours. Urinalysis:      Lab Results   Component Value Date/Time    NITRU Negative 10/08/2022 01:53 PM    BLOODU Negative 10/08/2022 01:53 PM    SPECGRAV >=1.030 10/08/2022 01:53 PM    GLUCOSEU Negative 10/08/2022 01:53 PM       Radiology:  The "[employer] [facility] LUMBAR PUNCTURE DIAG   Final Result   1. Technically successful diagnostic lumbar puncture performed at the L2-L3 level as described. Opening pressure 18 cm of water with closing pressure 11 cm of water. These values are normal.      CT HEAD WO CONTRAST   Final Result      Stable appearance of a small left tentorial subdural hemorrhage. The left cerebral convexity component was better visualized on the prior study. No new mass effect. MRI BRAIN W WO CONTRAST   Final Result   1. Thin but fairly diffuse left convexity and tentorial subdural hematoma measuring up to 3 mm in thickness unchanged since previous noncontrast head CT. MRA BRAIN:      FINDINGS: The internal carotid arteries are patent and normal in caliber through the skull base. The middle cerebral and anterior cerebral arteries are patent and normal in caliber. The anterior communicating artery is normal. Posterior cerebral arteries    are patent bilaterally. The basilar artery is patent and normal in caliber. Distal vertebral arteries are patent and normal in caliber. IMPRESSION:   1. Normal MRA of the brain. MRV brain:      FINDINGS: The internal jugular veins, sigmoid sinuses, transverse sinuses, straight sinus, and superior sagittal sinus are widely patent and normal in appearance. IMPRESSION:   1. Normal MRV of the brain. MRV HEAD W WO CONTRAST   Final Result   1. Thin but fairly diffuse left convexity and tentorial subdural hematoma measuring up to 3 mm in thickness unchanged since previous noncontrast head CT. MRA BRAIN:      FINDINGS: The internal carotid arteries are patent and normal in caliber through the skull base. The middle cerebral and anterior cerebral arteries are patent and normal in caliber. The anterior communicating artery is normal. Posterior cerebral arteries    are patent bilaterally. The basilar artery is patent and normal in caliber. Distal vertebral arteries are patent and normal in caliber. IMPRESSION:   1. Normal MRA of the brain. MRV brain:      FINDINGS: The internal jugular veins, sigmoid sinuses, transverse sinuses, straight sinus, and superior sagittal sinus are widely patent and normal in appearance. IMPRESSION:   1. Normal MRV of the brain. MRA HEAD WO CONTRAST   Final Result   1. Thin but fairly diffuse left convexity and tentorial subdural hematoma measuring up to 3 mm in thickness unchanged since previous noncontrast head CT. MRA BRAIN:      FINDINGS: The internal carotid arteries are patent and normal in caliber through the skull base. The middle cerebral and anterior cerebral arteries are patent and normal in caliber. The anterior communicating artery is normal. Posterior cerebral arteries    are patent bilaterally. The basilar artery is patent and normal in caliber. Distal vertebral arteries are patent and normal in caliber. IMPRESSION:   1. Normal MRA of the brain.       MRV brain:      FINDINGS: The internal jugular veins, sigmoid sinuses, transverse sinuses, straight sinus, and superior sagittal sinus are widely patent and normal in appearance. IMPRESSION:   1. Normal MRV of the brain. IR ANGIOGRAM CAROTID CEREBRAL LEFT    (Results Pending)           Assessment/Plan:    Active Hospital Problems    Diagnosis Date Noted    Subarachnoid hemorrhage ([address] [I60.9] 10/03/2022     Priority: Medium    Subdural hematoma [S06. 5XAA] 10/03/2022     Priority: Medium     Spontaneous SDH and SAH  - CT Head wo contrast 10/3 showed acute left extra-axial hemorrhage. There is a small left frontotemporal subdural hematoma. Left temporal subarachnoid hemorrhage component is also present.  - CTA Head/Neck 10/3 showed no apparent arterial high grade stenosis, occlusion or aneurysm within the head or neck. - repeat CT Head wo contrast 10/3/22 showed no significant interval change in left convexity and left tentorial subdural hemorrhage.  - MRA Head wo contrast 10/4/22 normal.  - MRV Head wwo contrast 10/4/22 normal.  - MRI wwo contrast 10/4/22 showed thin but fairly diffuse left convexity and tentorial subdural hematoma measuring up to 3 mm in thickness unchanged since previous noncontrast head CT.  - no history of trauma, fall.  - no history of hypertension.  - no history of easy bruising or bleeding.  - no history of drug use. - recent COVID19 infection one month ago and had a violent cough for 2 weeks which resolved 2 weeks ago. - UDS 10/4 positive for fentanyl and oxycodone, which patient is receiving inpatient.  - Received desmopressin at [facility]. - Continue keppra 500 mg BID x 7 days  - Maintain SBP <160. Pt not hypertensive.   - Goal platelet > 659 K and INR < 1.4.  - Neuro checks Q4H.  - Per neurosurgery, cerebral angiogram on 10/06/22 unremarkable    10/9  Still has headache   Today WBC down to  to 13 , could be due to steroid ?   He had LP 10/07 and CSF study so far looks okay  Discussed with neurology   Will hold on iv AB for now  Follow csf cx , negative so far   Angiogram unremarkable  -    Intractable Headache  - in the setting of SAH, SDH however might have other etiology  -CPR 9.4  -Cerebral angiogram unremarkable  -Responding well to solumedrol from yesterday. Taper per neurology  -LP done today (10/07/22) other lab per neurology for interpretation.  -started medrol dose pack today per neurology (10/07/22)  -Needs outpatient follow with neurology to better establish diagnosis/quantify headaches  - pain control. stopped IV pain meds  Limit use of opiate     Dyslipidemia  - Tot chol 151, HDL 39, LDL 94, Trig 89 on 10/5/22.  - Continue home lipitor 40 mg HS. History of Hypothyroidism  - has not been taking levothyroxine for 6 weeks. Reportedly he was started for borderline function. - check TSH, free T4, still pending     History of Hypogonadism  - was taking testosterone for 2 years. Stopped about four years ago. DVT Prophylaxis: SCD  Diet: ADULT DIET; Regular  Code Status: Full Code    PT/OT Eval Status: patient able to ambulate.     Dispo - maybe tomorrow if headache under control   [place] MD

## 2022-10-09 NOTE — PLAN OF CARE
Problem: Discharge Planning  Goal: Discharge to home or other facility with appropriate resources  10/9/2022 0759 by Hari Shah RN  Outcome: Progressing     Problem: Pain  Goal: Verbalizes/displays adequate comfort level or baseline comfort level  10/9/2022 1942 by Maria T Prado RN  Outcome: Progressing  10/9/2022 0759 by Hari Shah RN  Outcome: Progressing     Problem: Neurosensory - Adult  Goal: Achieves stable or improved neurological status  10/9/2022 1942 by Maria T Prado RN  Outcome: Progressing  10/9/2022 0759 by Hari Shah RN  Outcome: Progressing  Goal: Achieves maximal functionality and self care  10/9/2022 0759 by Hari Shah RN  Outcome: Progressing     Problem: Safety - Adult  Goal: Free from fall injury  10/9/2022 0759 by Hari Shah RN  Outcome: Progressing     Problem: ABCDS Injury Assessment  Goal: Absence of physical injury  10/9/2022 0759 by Hari Shah RN  Outcome: Progressing

## 2022-10-10 VITALS
DIASTOLIC BLOOD PRESSURE: 86 MMHG | HEIGHT: 72 IN | SYSTOLIC BLOOD PRESSURE: 137 MMHG | TEMPERATURE: 97.9 F | HEART RATE: 61 BPM | BODY MASS INDEX: 35.68 KG/M2 | OXYGEN SATURATION: 94 % | RESPIRATION RATE: 16 BRPM | WEIGHT: 263.4 LBS

## 2022-10-10 LAB
ANION GAP SERPL CALCULATED.3IONS-SCNC: 10 MMOL/L (ref 3–16)
BASOPHILS ABSOLUTE: 0 K/UL (ref 0–0.2)
BASOPHILS RELATIVE PERCENT: 0.2 %
BUN BLDV-MCNC: 17 MG/DL (ref 7–20)
CALCIUM SERPL-MCNC: 9.2 MG/DL (ref 8.3–10.6)
CHLORIDE BLD-SCNC: 99 MMOL/L (ref 99–110)
CO2: 27 MMOL/L (ref 21–32)
CREAT SERPL-MCNC: 0.9 MG/DL (ref 0.9–1.3)
EOSINOPHILS ABSOLUTE: 0 K/UL (ref 0–0.6)
EOSINOPHILS RELATIVE PERCENT: 0.2 %
GFR AFRICAN AMERICAN: >60
GFR NON-AFRICAN AMERICAN: >60
GLUCOSE BLD-MCNC: 89 MG/DL (ref 70–99)
HCT VFR BLD CALC: 45.1 % (ref 40.5–52.5)
HEMOGLOBIN: 15.2 G/DL (ref 13.5–17.5)
LYMPHOCYTES ABSOLUTE: 2 K/UL (ref 1–5.1)
LYMPHOCYTES RELATIVE PERCENT: 14.8 %
MCH RBC QN AUTO: 31.2 PG (ref 26–34)
MCHC RBC AUTO-ENTMCNC: 33.8 G/DL (ref 31–36)
MCV RBC AUTO: 92.3 FL (ref 80–100)
MONOCYTES ABSOLUTE: 0.7 K/UL (ref 0–1.3)
MONOCYTES RELATIVE PERCENT: 5.3 %
NEUTROPHILS ABSOLUTE: 10.7 K/UL (ref 1.7–7.7)
NEUTROPHILS RELATIVE PERCENT: 79.5 %
PDW BLD-RTO: 13.2 % (ref 12.4–15.4)
PLATELET # BLD: 291 K/UL (ref 135–450)
PMV BLD AUTO: 9.1 FL (ref 5–10.5)
POTASSIUM SERPL-SCNC: 4.3 MMOL/L (ref 3.5–5.1)
RBC # BLD: 4.88 M/UL (ref 4.2–5.9)
SODIUM BLD-SCNC: 136 MMOL/L (ref 136–145)
T4 FREE: 1.3 NG/DL (ref 0.9–1.8)
TSH SERPL DL<=0.05 MIU/L-ACNC: 8.15 UIU/ML (ref 0.27–4.2)
WBC # BLD: 13.5 K/UL (ref 4–11)

## 2022-10-10 PROCEDURE — 80053 COMPREHEN METABOLIC PANEL: CPT

## 2022-10-10 PROCEDURE — 36415 COLL VENOUS BLD VENIPUNCTURE: CPT

## 2022-10-10 PROCEDURE — 6370000000 HC RX 637 (ALT 250 FOR IP)

## 2022-10-10 PROCEDURE — 85025 COMPLETE CBC W/AUTO DIFF WBC: CPT

## 2022-10-10 PROCEDURE — 6360000002 HC RX W HCPCS

## 2022-10-10 PROCEDURE — 6370000000 HC RX 637 (ALT 250 FOR IP): Performed by: INTERNAL MEDICINE

## 2022-10-10 PROCEDURE — 6370000000 HC RX 637 (ALT 250 FOR IP): Performed by: NURSE PRACTITIONER

## 2022-10-10 RX ORDER — SENNA AND DOCUSATE SODIUM 50; 8.6 MG/1; MG/1
2 TABLET, FILM COATED ORAL DAILY
Qty: 60 TABLET | Refills: 1 | Status: SHIPPED | OUTPATIENT
Start: 2022-10-11

## 2022-10-10 RX ORDER — GABAPENTIN 300 MG/1
300 CAPSULE ORAL NIGHTLY
Status: DISCONTINUED | OUTPATIENT
Start: 2022-10-10 | End: 2022-10-10 | Stop reason: HOSPADM

## 2022-10-10 RX ORDER — OXYCODONE HYDROCHLORIDE 10 MG/1
10 TABLET ORAL EVERY 4 HOURS PRN
Qty: 20 TABLET | Refills: 0 | Status: SHIPPED | OUTPATIENT
Start: 2022-10-10 | End: 2022-10-17

## 2022-10-10 RX ORDER — GABAPENTIN 100 MG/1
200 CAPSULE ORAL
Status: DISCONTINUED | OUTPATIENT
Start: 2022-10-10 | End: 2022-10-10 | Stop reason: HOSPADM

## 2022-10-10 RX ORDER — METHYLPREDNISOLONE 4 MG/1
4 TABLET ORAL
Qty: 2 TABLET | Refills: 0 | Status: SHIPPED | OUTPATIENT
Start: 2022-10-11 | End: 2022-10-13

## 2022-10-10 RX ORDER — POLYETHYLENE GLYCOL 3350 17 G/17G
17 POWDER, FOR SOLUTION ORAL DAILY
Qty: 527 G | Refills: 1 | Status: SHIPPED | OUTPATIENT
Start: 2022-10-11 | End: 2022-11-10

## 2022-10-10 RX ORDER — GABAPENTIN 100 MG/1
200 CAPSULE ORAL 3 TIMES DAILY
Qty: 90 CAPSULE | Refills: 1 | Status: SHIPPED | OUTPATIENT
Start: 2022-10-10 | End: 2022-11-09

## 2022-10-10 RX ORDER — GABAPENTIN 100 MG/1
200 CAPSULE ORAL
Status: DISCONTINUED | OUTPATIENT
Start: 2022-10-11 | End: 2022-10-10

## 2022-10-10 RX ORDER — MECLIZINE HCL 12.5 MG/1
12.5 TABLET ORAL 3 TIMES DAILY PRN
Qty: 20 TABLET | Refills: 0 | Status: SHIPPED | OUTPATIENT
Start: 2022-10-10

## 2022-10-10 RX ORDER — METHYLPREDNISOLONE 4 MG/1
4 TABLET ORAL NIGHTLY
Qty: 2 TABLET | Refills: 0 | Status: SHIPPED | OUTPATIENT
Start: 2022-10-10 | End: 2022-10-12

## 2022-10-10 RX ORDER — LEVETIRACETAM 500 MG/1
500 TABLET ORAL 2 TIMES DAILY
Qty: 4 TABLET | Refills: 0 | Status: SHIPPED | OUTPATIENT
Start: 2022-10-10 | End: 2022-10-12

## 2022-10-10 RX ADMIN — GABAPENTIN 200 MG: 100 CAPSULE ORAL at 08:42

## 2022-10-10 RX ADMIN — GABAPENTIN 200 MG: 100 CAPSULE ORAL at 16:54

## 2022-10-10 RX ADMIN — SENNOSIDES AND DOCUSATE SODIUM 2 TABLET: 50; 8.6 TABLET ORAL at 08:42

## 2022-10-10 RX ADMIN — ACETAMINOPHEN 650 MG: 325 TABLET, FILM COATED ORAL at 14:10

## 2022-10-10 RX ADMIN — OXYCODONE 10 MG: 5 TABLET ORAL at 09:24

## 2022-10-10 RX ADMIN — ACETAMINOPHEN 650 MG: 325 TABLET, FILM COATED ORAL at 00:54

## 2022-10-10 RX ADMIN — OXYCODONE 10 MG: 5 TABLET ORAL at 14:09

## 2022-10-10 RX ADMIN — ACETAMINOPHEN 650 MG: 325 TABLET, FILM COATED ORAL at 05:04

## 2022-10-10 RX ADMIN — LORAZEPAM 0.5 MG: 0.5 TABLET ORAL at 08:38

## 2022-10-10 RX ADMIN — OXYCODONE 10 MG: 5 TABLET ORAL at 00:54

## 2022-10-10 RX ADMIN — ACETAMINOPHEN 650 MG: 325 TABLET, FILM COATED ORAL at 09:25

## 2022-10-10 RX ADMIN — POLYETHYLENE GLYCOL 3350 17 G: 17 POWDER, FOR SOLUTION ORAL at 08:42

## 2022-10-10 RX ADMIN — OXYCODONE 10 MG: 5 TABLET ORAL at 05:04

## 2022-10-10 RX ADMIN — LEVETIRACETAM 500 MG: 500 TABLET, FILM COATED ORAL at 08:42

## 2022-10-10 RX ADMIN — METHYLPREDNISOLONE 4 MG: 4 TABLET ORAL at 05:04

## 2022-10-10 RX ADMIN — LORAZEPAM 0.5 MG: 0.5 TABLET ORAL at 15:19

## 2022-10-10 RX ADMIN — METHYLPREDNISOLONE 4 MG: 4 TABLET ORAL at 12:31

## 2022-10-10 ASSESSMENT — PAIN DESCRIPTION - LOCATION
LOCATION: HEAD

## 2022-10-10 ASSESSMENT — PAIN DESCRIPTION - ONSET
ONSET: ON-GOING

## 2022-10-10 ASSESSMENT — PAIN SCALES - GENERAL
PAINLEVEL_OUTOF10: 7
PAINLEVEL_OUTOF10: 5
PAINLEVEL_OUTOF10: 7
PAINLEVEL_OUTOF10: 0
PAINLEVEL_OUTOF10: 4
PAINLEVEL_OUTOF10: 3
PAINLEVEL_OUTOF10: 0
PAINLEVEL_OUTOF10: 7

## 2022-10-10 ASSESSMENT — PAIN DESCRIPTION - ORIENTATION
ORIENTATION: LEFT
ORIENTATION: RIGHT;MID
ORIENTATION: LEFT
ORIENTATION: LEFT

## 2022-10-10 ASSESSMENT — PAIN SCALES - WONG BAKER
WONGBAKER_NUMERICALRESPONSE: 0
WONGBAKER_NUMERICALRESPONSE: 0

## 2022-10-10 ASSESSMENT — PAIN DESCRIPTION - DESCRIPTORS
DESCRIPTORS: ACHING

## 2022-10-10 ASSESSMENT — PAIN DESCRIPTION - PAIN TYPE
TYPE: ACUTE PAIN
TYPE: ACUTE PAIN

## 2022-10-10 ASSESSMENT — PAIN DESCRIPTION - FREQUENCY
FREQUENCY: CONTINUOUS
FREQUENCY: CONTINUOUS

## 2022-10-10 NOTE — CARE COORDINATION
Upon review of pts chart, pt is from home, IPTA, no current home services. Pt denies a need for any. Pt has transport at time of dc. CM will sign off at this time. Please consult CM/SW if any dcp needs arise.     Electronically signed by Amina Berry RN on 10/10/2022 at 1:40 PM

## 2022-10-10 NOTE — PLAN OF CARE
Problem: Pain  Goal: Verbalizes/displays adequate comfort level or baseline comfort level  10/10/2022 0817 by Tracey Bose, RN  Outcome: Progressing     Problem: Neurosensory - Adult  Goal: Achieves stable or improved neurological status  10/10/2022 0817 by Tracey Bose, RN  Outcome: Progressing     Problem: Neurosensory - Adult  Goal: Achieves maximal functionality and self care  Outcome: Progressing     Problem: Safety - Adult  Goal: Free from fall injury  Outcome: Progressing

## 2022-10-10 NOTE — PLAN OF CARE
AOX4,RA,VSS, PRN meds given for pain,SBA, vioiding freely per BRP. Adequate oral intake, slept fairly all through out the night, All needs attended.

## 2022-10-10 NOTE — PROGRESS NOTES
Pt A&O, VSS. Pt complains of severe headache pain which is being managed with PRN and scheduled medication given per MAR. Pt up x1 SBA. All fall and safety precautions in place, bed locked and in lowest position, call light and belongings within reach. Pt denies further needs at this time.

## 2022-10-10 NOTE — PROGRESS NOTES
Pt discharged with all belongings and all questions answered. IV removed, pt educated. Wife here for transport home.

## 2022-10-10 NOTE — DISCHARGE SUMMARY
Patient ID: Rachel Salas      Patient's PCP: No primary care provider on file. Admit Date: 10/3/2022   Discharge Date: 10/10/2022  Admitting Physician: Angelica Randolph MD  Discharge Physician: Myles De Los Santos MD ,MD  Discharge Diagnoses:        Spontaneous SDH and SAH  - CT Head wo contrast 10/3 showed acute left extra-axial hemorrhage. There is a small left frontotemporal subdural hematoma. Left temporal subarachnoid hemorrhage component is also present.  - CTA Head/Neck 10/3 showed no apparent arterial high grade stenosis, occlusion or aneurysm within the head or neck. - repeat CT Head wo contrast 10/3/22 showed no significant interval change   - MRA Head wo contrast 10/4/22 normal  - MRV Head wwo contrast 10/4/22 normal.  - MRI wwo contrast 10/4/22 showed thin but fairly diffuse left convexity and tentorial subdural hematoma measuring up to 3 mm in thickness unchanged since previous noncontrast head CT.  - no history of trauma, fall, hypertension, easy bruising or bleeding.  - no history of drug use. - recent COVID19 infection one month ago and had a violent cough for 2 weeks which resolved 2 weeks ago. - UDS 10/4 positive for fentanyl and oxycodone, which patient is receiving inpatient.  - Received desmopressin at [address] [place] [address], [place] [id number] 500 mg BID x 7 days  - Per neurosurgery, cerebral angiogram on 10/06/22 unremarkable         Intractable Headache  - in the setting of SAH. Per Neurology,size/degree of hemorrhage  does not  explain the severe headache  -CPR 9.4  -Cerebral angiogram unremarkable  -LP done 10/07/22) neg so far  -started medrol dose pack today per neurology (10/07/22) taper as below.  -Needs outpatient follow with neurology to better establish diagnosis/quantify headaches  - pain control. stopped IV pain meds  Limit use of opiate      Dyslipidemia  - Tot chol 151, HDL 39, LDL 94, Trig 89 on 10/5/22.  - Continue home lipitor 40 mg HS.      History of Hypothyroidism  - has not been taking levothyroxine for 6 weeks. Reportedly he was started for borderline function. History of Hypogonadism  - was taking testosterone for 2 years. Stopped about four years ago. [facility] Course:  52yo man  who presented with severe headache, which is new for him, and has persisted with worsening over the last 7 days, found to have very small SDH and SAH in left frontotemporal lobe    Initially presented to [address] [place] [address] CT--->acute left extra-axial hemorrhage with left small frontotemporal subdural hematoma and left temporal subarachnoid hemorrhage. There was no midline shift. Repeat head CT 6 hours later was stable. Transferred to [facility].  Was admitted and treated as above. Physical Exam:  /86   Pulse 61   Temp 97.9 °F (36.6 °C) (Oral)   Resp 16   Ht 6' (1.829 m)   Wt 263 lb 6.4 oz (119.5 kg)   SpO2 94%   BMI 35.72 kg/m²   General appearance: alert, appears stated age and cooperative  Head: Normocephalic, without obvious abnormality, atraumatic  Eyes: conjunctivae/corneas clear. PERRL, EOM's intact. Ears: External ears -normal  Nose: No drainage or sinus tenderness.   Throat: lips, mucosa, and tongue normal; teeth and gums normal  Neck: no adenopathy, no carotid bruit, no JVD, supple, symmetrical, trachea midline and thyroid not enlarged, symmetric, no tenderness/mass/nodules  Lungs: clear to auscultation bilaterally  Heart: regular rate and rhythm, S1, S2 normal, no murmur,   Abdomen: soft, non-tender; bowel sounds normal; no masses,  no organomegaly  Extremities: extremities normal, atraumatic, no cyanosis or edema  Neurologic: Grossly normal    Consults: neurology  Significant Diagnostic Studies:  MRI head  Treatments: IV hydration  Disposition: home  Discharged Condition: Stable  Follow Up: Primary Care Physician in one week  Discharge Medications:     Medication List        START taking these medications      gabapentin 100 MG capsule  Commonly known as: NEURONTIN  Take 2 capsules by mouth 3 times daily for 30 days. levETIRAcetam 500 MG tablet  Commonly known as: KEPPRA  Take 1 tablet by mouth 2 times daily for 2 days     meclizine 12.5 MG tablet  Commonly known as: ANTIVERT  Take 1 tablet by mouth 3 times daily as needed for Dizziness     * methylPREDNISolone 4 MG tablet  Commonly known as: MEDROL  Take 1 tablet by mouth nightly for 2 doses     * methylPREDNISolone 4 MG tablet  Commonly known as: MEDROL  Take 1 tablet by mouth every morning (before breakfast) for 2 doses  Start taking on: October 11, 2022     oxyCODONE HCl 10 MG immediate release tablet  Commonly known as: OXY-IR  Take 1 tablet by mouth every 4 hours as needed for Pain for up to 7 days. polyethylene glycol 17 g packet  Commonly known as: GLYCOLAX  Take 17 g by mouth daily  Start taking on: October 11, 2022     sennosides-docusate sodium 8.6-50 MG tablet  Commonly known as: SENOKOT-S  Take 2 tablets by mouth daily  Start taking on: October 11, 2022           * This list has 2 medication(s) that are the same as other medications prescribed for you. Read the directions carefully, and ask your doctor or other care provider to review them with you. STOP taking these medications      aspirin 81 MG EC tablet            ASK your doctor about these medications      atorvastatin 40 MG tablet  Commonly known as: LIPITOR  Take 1 tablet by mouth nightly               Where to Get Your Medications        These medications were sent to [facility], [address] Lizeth Johnson [phone] - F [phone] [address] [facility] [address]      Phone: [phone]   gabapentin 100 MG capsule  levETIRAcetam 500 MG tablet  meclizine 12.5 MG tablet  methylPREDNISolone 4 MG tablet  methylPREDNISolone 4 MG tablet  polyethylene glycol 17 g packet  sennosides-docusate sodium 8.6-50 MG tablet       You can get these medications from any pharmacy    Bring a paper prescription for each of these medications  oxyCODONE HCl 10 MG immediate release tablet        Activity: activity as tolerated  Diet: cardiac dietWound Care: none needed  Time Spent on discharge is more than 45 minutes discussing plan of care and discharge medications with patient and nursing staff    Signed:  MD JUAN  Hospitalist Service     10/10/2022

## 2022-10-10 NOTE — PLAN OF CARE
Pt was concern about the frequency of his Gabapentin assuming it was given every 8 hrs hence he is getting it thrice a day on a scheduled dose for now. Informed the hospitalist and advise the pt to discuss it with his attending this AM.

## 2022-10-11 NOTE — ADT AUTH CERT
Utilization Reviews       Subarachnoid Hemorrhage, Nonsurgical Treatment - Care Day 7 (10/9/2022) by Leodan Bronson RN       Review Status Review Entered   Completed 10/11/2022 1004       Created By   Leodan Bronson RN      Criteria Review      Care Day: 7 Care Date: 10/9/2022 Level of Care: Intermediate Care    Guideline Day 2    Clinical Status    (X) * Surgical indications absent    10/11/2022 10:04 AM EDT by Jay Elder      None noted    (X) * Hemodynamic stability    10/11/2022 10:04 AM EDT by Jay Elder      HR 68 65 60   /87 120/73 120/72    (X) * Mechanical ventilation absent    10/11/2022 10:04 AM EDT by Jay Elder      At room air    ( ) * Pain absent or managed    10/11/2022 10:04 AM EDT by Jay Elder      Pain score 9/10    Activity    (X) * Up to chair    10/11/2022 10:04 AM EDT by Jay Elder      Ambulated in [facility]. Routes    ( ) IV fluids    10/11/2022 10:04 AM EDT by Jya Elder      None noted    ( ) IV medications    10/11/2022 10:04 AM EDT by Jay Elder      None noted    ( ) Possible central line venous access    10/11/2022 10:04 AM EDT by Jay Elder      None noted    (X) Diet as tolerated    10/11/2022 10:04 AM EDT by Jay Elder      ADULT DIET;  Regular [    Interventions    (X) Neurologic checks    10/11/2022 10:04 AM EDT by Jay Elder      Neuro checks EVERY 4 HOURS    ( ) Possible neurointerventional procedure (eg, coiling)    10/11/2022 10:04 AM EDT by Jya Elder      None noted    ( ) DVT prophylaxis    10/11/2022 10:04 AM EDT by Jay Elder      None noted    ( ) Possible mechanical ventilation    10/11/2022 10:04 AM EDT by Jay Elder      None noted    (X) Possible cardiac monitoring    10/11/2022 10:04 AM EDT by Jay Elder      Continuous    ( ) Glucose control    10/11/2022 10:04 AM EDT by Jay Elder      None noted    ( ) Temperature control 10/11/2022 10:04 AM EDT by Griselhemanth Gómez      None noted    Medications    (X) * Sedation absent    10/11/2022 10:04 AM EDT by Griselhemanth Gómez      None noted    ( ) Possible sedatives    10/11/2022 10:04 AM EDT by Grisel Gómez      None noted    (X) Possible analgesics    10/11/2022 10:04 AM EDT by Grisel Gómez      (TYLENOL) tablet 650 mg EVERY 4 HOURS PRN PO x5  (ROXICODONE) immediate release tablet 10 mg EVERY 4 HOURS PRN PO x5    ( ) Nimodipine    10/11/2022 10:04 AM EDT by Grisel Gómez      None noted    (X) Anticonvulsants as appropriate    10/11/2022 10:04 AM EDT by Grisel Punch      Neuro checks EVERY 4 HOURS 2 TIMES DAILY PO    ( ) Antihypertensive as indicated    10/11/2022 10:04 AM EDT by Grisel Punch      None noted       Definitions for Care Day 7    Hemodynamic stability    (X) Hemodynamic stability, as indicated by  1 or more  of the following :       (X) Hemodynamic abnormalities at baseline or acceptable for next level of care       ( ) Patient hemodynamically stable, as indicated by  ALL  of the following  (1) (2) (3) (4) (5):          (X) Tachycardia absent          (X) Hypotension absent       Tachycardia absent    (X) Tachycardia absent, as indicated by  1 or more  of the following  (1) (2):       (X) Heart rate less than or equal to 100 beats per minute in adult or child 6 years or older       Hypotension absent    (X) Hypotension absent, as indicated by  1 or more  of the following  (1) (2) (3) (4):       (X) SBP greater than or equal to 90 mm Hg and without recent decrease greater than 40 mm Hg from       baseline in adult or child 10 years or older       * Milestone   Additional Notes   DATE:10/9/22         PERTINENT UPDATES:   Still has headache with pain score of 9/10. Given TYLENOL PRN PO x5 and  ROXICODONE PRN PO x5    Will hold on iv AB for now.          VITALS: TEMP 98.7 (37.1) ORAL RR 18 HR 68 /87  SPO2 94% RA         ABNL/PERTINENT LABS/RADIOLOGY/DIAGNOSTIC STUDIES:   Sodium: 135 (L)   Glucose, Random: 111 (H)   WBC: 13.8 (H)   Neutrophils Absolute: 11.1 (H)         PHYSICAL EXAM:   NEURO alert, oriented x 4, no facial asymmetry, no dysarthria, no protonator drift, UE and LE strength 5/5, gross sensory to light touch intact, finger to nose without dysmetria. MD CONSULTS/ASSESSMENT AND PLAN:   IM   Spontaneous SDH and SAH   - CT Head wo contrast  showed acute left extra-axial hemorrhage. There is a small left frontotemporal subdural hematoma. Left temporal subarachnoid hemorrhage component is also present.   - CTA Head/Neck showed no apparent arterial high grade stenosis, occlusion or aneurysm within the head or neck. - repeat CT Head wo contrast  showed no significant interval change in left convexity and left tentorial subdural hemorrhage.   - MRA Head wo contrast  normal.   - MRV Head wwo contrast normal.   - MRI wwo contrast showed thin but fairly diffuse left convexity and tentorial subdural hematoma measuring up to 3 mm in thickness unchanged since previous noncontrast head CT.   - no history of trauma, fall.   - no history of hypertension.   - no history of easy bruising or bleeding.   - no history of drug use. - recent COVID19 infection one month ago and had a violent cough for 2 weeks which resolved 2 weeks ago. - UDS  positive for fentanyl and oxycodone, which patient is receiving inpatient.   - Received desmopressin at [facility]. - Continue keppra 500 mg BID x 7 days   - Maintain SBP <160.   Pt not hypertensive.    - Goal platelet > 970 K and INR < 1.4.   - Neuro checks Q4H.   - Per neurosurgery, cerebral angiogram unremarkable       Still has headache    Today WBC down to  to 13 , could be due to steroid   He had LP and CSF study so far looks okay   Discussed with neurology    Will hold on iv AB for now   Follow [facility] cx , negative so far    Angiogram  unremarkable       Intractable Headache   - in the setting of SAH, SDH however might have other etiology   -CPR 9.4   -Cerebral angiogram unremarkable   -Responding well to solumedrol from yesterday. Taper per neurology   -LP done today other lab per neurology for interpretation.   -started medrol dose pack today per neurology   -Needs outpatient follow with neurology to better establish diagnosis/quantify headaches   - pain control. stopped IV pain meds   Limit use of opiate        Dyslipidemia   - Tot chol 151, HDL 39, LDL 94, Trig 89    - Continue home lipitor 40 mg HS. History of Hypothyroidism   - has not been taking levothyroxine for 6 weeks. Reportedly he was started for borderline function. - check TSH, free T4, still pending        History of Hypogonadism   - was taking testosterone for 2 years. Stopped about four years ago. ORDERS:   Vital signs        Subarachnoid Hemorrhage, Nonsurgical Treatment - Care Day 5 (10/7/2022) by Chevy Kothari RN       Review Status Review Entered   Completed 10/11/2022 0940       Created By   Chevy Kothari RN      Criteria Review      Care Day: 5 Care Date: 10/7/2022 Level of Care: Intermediate Care    Guideline Day 2    Clinical Status    (X) * Surgical indications absent    10/11/2022 9:40 AM EDT by Marcio Pedraza      ADULT DIET;  Regular    (X) * Hemodynamic stability    10/11/2022 9:40 AM EDT by Marcio Pedraza      HR 93 84 76  /89 149/84 128/86    (X) * Mechanical ventilation absent    10/11/2022 9:40 AM EDT by Marcio Pedraza      At room air    ( ) * Pain absent or managed    10/11/2022 9:40 AM EDT by Marcio Pedraza      Pain score 7/10    Activity    ( ) * Up to chair    10/11/2022 9:40 AM EDT by [employer]    Routes    ( ) IV fluids    10/11/2022 9:40 AM EDT by Marcio Pedraza      None noted    (X) IV medications    10/11/2022 9:40 AM EDT by Marcio Pedraza      Morphine IV    ( ) Possible central line venous access    10/11/2022 9:40 AM EDT by Marcio Pedraza None noted    (X) Diet as tolerated    10/11/2022 9:40 AM EDT by Nate Duncan      ADULT DIET;  Regular    Interventions    (X) Neurologic checks    10/11/2022 9:40 AM EDT by Nate Duncan      Neuro checks EVERY 4 HOURS    ( ) Possible neurointerventional procedure (eg, coiling)    10/11/2022 9:40 AM EDT by Nate Duncan      None noted    ( ) DVT prophylaxis    10/11/2022 9:40 AM EDT by Nate Duncan      None noted    ( ) Possible mechanical ventilation    10/11/2022 9:40 AM EDT by Nate Duncan      None noted    (X) Possible cardiac monitoring    10/11/2022 9:40 AM EDT by Nate Duncan      Continuous    ( ) Glucose control    10/11/2022 9:40 AM EDT by Nate Duncan      None noted    ( ) Temperature control    10/11/2022 9:40 AM EDT by Nate Duncan      None noted    Medications    (X) * Sedation absent    10/11/2022 9:40 AM EDT by Nate Duncan      None noted    ( ) Possible sedatives    10/11/2022 9:40 AM EDT by Nate Duncan      None noted    (X) Possible analgesics    10/11/2022 9:40 AM EDT by Nate Duncan      (TYLENOL) tablet 650 mg  EVERY 4 HOURS PRN PO x5  morphine (PF) injection 2 mg EVERY 2 HOURS PRN IV x1  (ROXICODONE) immediate release tablet 10 mg EVERY 4 HOURS PRN PO x4  (ROXICODONE) immediate release tablet 5 mg EVERY 4 HOURS PRN PO x1    ( ) Nimodipine    10/11/2022 9:40 AM EDT by Nate Duncan      None noted    (X) Anticonvulsants as appropriate    10/11/2022 9:40 AM EDT by Nate Duncan      (KEPPRA) tablet 500 mg 2 TIMES DAILY PO    ( ) Antihypertensive as indicated    10/11/2022 9:40 AM EDT by Nate Duncan      None noted       Definitions for Care Day 5    Hemodynamic stability    (X) Hemodynamic stability, as indicated by  1 or more  of the following :       (X) Hemodynamic abnormalities at baseline or acceptable for next level of care       ( ) Patient hemodynamically stable, as indicated by  ALL  of the following  (1) (2) (3) (4) (5):          (X) Tachycardia absent          (X) Hypotension absent       Tachycardia absent    (X) Tachycardia absent, as indicated by  1 or more  of the following  (1) (2):       (X) Heart rate less than or equal to 100 beats per minute in adult or child 6 years or older       Hypotension absent    (X) Hypotension absent, as indicated by  1 or more  of the following  (1) (2) (3) (4):       (X) SBP greater than or equal to 90 mm Hg and without recent decrease greater than 40 mm Hg from       baseline in adult or child 10 years or older       * Milestone   Additional Notes   DATE: 10/7/22         PERTINENT UPDATES:   Noted left head pain with pain score of 7/10. Given TYLENOL PRN PO x5, morphine PRN IV, ROXICODONE PRN PO x4         VITALS: TEMP 98.2 (36.8) ORAL RR 18 HR 93 /89 SPO2 94% RA         ABNL/PERTINENT LABS/RADIOLOGY/DIAGNOSTIC STUDIES:   FL LUMBAR PUNCTURE   1. Technically successful diagnostic lumbar puncture performed at the L2-L3 level as described. Opening pressure 18 cm of water with closing pressure 11 cm of water. These values are normal.   CO2: 19 (L)   Anion Gap: 17 (H)   Glucose, Random: 145 (H)   WBC: 13.7 (H)   Glucose, CSF: 82 (H)   RBC, CSF: 4 ! PHYSICAL EXAM:   NEURO alert, oriented x 4, no facial asymmetry, no dysarthria, no protonator drift, UE and LE strength 5/5, gross sensory to light touch intact, finger to nose without dysmetria. MD CONSULTS/ASSESSMENT AND PLAN:   IM    ASSESSMENT AND PLAN:   Spontaneous SDH and SAH   - CT Head wo contrast showed acute left extra-axial hemorrhage. There is a small left frontotemporal subdural hematoma. Left temporal subarachnoid hemorrhage component is also present.   - CTA Head/Neck showed no apparent arterial high grade stenosis, occlusion or aneurysm within the head or neck.    - repeat CT Head wo contrast showed no significant interval change in left convexity and left tentorial subdural hemorrhage.   - MRA Head wo contrast normal.   - MRV Head wwo contrast normal.   - MRI wwo contrast  showed thin but fairly diffuse left convexity and tentorial subdural hematoma measuring up to 3 mm in thickness unchanged since previous noncontrast head CT.   - no history of trauma, fall.   - no history of hypertension.   - no history of easy bruising or bleeding.   - no history of drug use. - recent COVID19 infection one month ago and had a violent cough for 2 weeks which resolved 2 weeks ago. - UDS positive for fentanyl and oxycodone, which patient is receiving inpatient.   - Received desmopressin at [facility]. - Continue keppra 500 mg BID x 7 days   - Maintain SBP <160. Pt not hypertensive.    - Goal platelet > 074 K and INR < 1.4.   - Neuro checks Q4H.   - Per neurosurgery, cerebral angiogram unremarkable       Intractable Headache   - in the setting of SAH, SDH however might have other etiology   -CPR 9.4   -Cerebral angiogram unremarkable   -Responding well to solumedrol from yesterday. Taper per neurology   -LP done today other lab per neurology for interpretation.   -started medrol dose pack today per neurology   -Needs outpatient follow with neurology to better establish diagnosis/quantify headaches   - pain control. stopped IV pain meds       Dyslipidemia   - Tot chol 151, HDL 39, LDL 94, Trig 89    - Continue home lipitor 40 mg HS. History of Hypothyroidism   - has not been taking levothyroxine for 6 weeks. Reportedly he was started for borderline function.   - TSH, free T4. History of Hypogonadism   - was taking testosterone for 2 years. Stopped about four years ago. NEUROSURGERY   IMPRESSION:   w/ spontaneous SAH and SDH, concerning for possible vascular etiology. Patient denies any trauma. CTA / MRI/MRA negative. Angiography with Dr. Rk Price. yesterday unrevealing. RECOMMENDATIONS:   -Neurology following for headache and face pain   -Keppra 500mg BID x 7 days total   -Maintain SBP <160;  If PRN med insufficient after 30 minutes, start Nicardipine infusion   -Keep Plt >100k & INR <1.4   -Hold all anticoagulation & antiplatelet for 2 weeks (ok to give sq heparin for dvt prophylaxis)      NEUROLOGY   Assessment/Plan:   - Start medrol dose pack today   - Await CSF labs   - Limit opiate use to treat headaches, as able   - Continue Gabapentin   - Needs outpatient follow to better establish diagnosis/quantify headaches   - Maintain SBP < 160   -Keppra 500mg BID x 7 days total   -Hold all anticoagulation & antiplatelet for 2 weeks (ok to give sq heparin for dvt prophylaxis)   - Call Neurology if any exam changes         ORDERS:   Vital signs

## 2022-10-13 LAB
CSF INTERPRETATION: NORMAL
OLIGOCLONAL BANDS CSF: 0
OLIGOCLONAL BANDS: 0

## 2022-10-15 LAB
CSF CULTURE: NORMAL
GRAM STAIN RESULT: NORMAL

## 2022-10-23 LAB
A/G RATIO: 1.4 (ref 1.1–2.2)
ALBUMIN SERPL-MCNC: 4.2 G/DL (ref 3.4–5)
ALP BLD-CCNC: 115 U/L (ref 40–129)
ALT SERPL-CCNC: 37 U/L (ref 10–40)
AST SERPL-CCNC: 15 U/L (ref 15–37)
BILIRUB SERPL-MCNC: 0.5 MG/DL (ref 0–1)
POTASSIUM REFLEX MAGNESIUM: 4.3 MMOL/L (ref 3.5–5.1)
TOTAL PROTEIN: 7.3 G/DL (ref 6.4–8.2)

## 2022-11-15 ENCOUNTER — HOSPITAL ENCOUNTER (OUTPATIENT)
Dept: CT IMAGING | Age: 47
Discharge: HOME | End: 2022-11-15
Payer: COMMERCIAL

## 2022-11-15 DIAGNOSIS — I60.9 SAH (SUBARACHNOID HEMORRHAGE) (HCC): ICD-10-CM

## 2022-11-15 PROCEDURE — 70450 CT HEAD/BRAIN W/O DYE: CPT

## 2023-02-16 NOTE — PROGRESS NOTES
Neurology Progress Note - 10/10/22      Patient seen and examined. I was asked to revisit with patient as they had questions for the neurology attending. Patient is worried about pain control when he goes home. I answered his and his sister's questions about testing that has been done, things to expect, and when to return to the emergency room. History reviewed. No pertinent past medical history. History reviewed. No pertinent surgical history. Patient  reports that he has never smoked. He has never used smokeless tobacco. He reports that he does not currently use alcohol. He reports that he does not currently use drugs. Patient's family history is not on file. Patient has No Known Allergies.       Current Facility-Administered Medications:     gabapentin (NEURONTIN) capsule 300 mg, 300 mg, Oral, Nightly, Lesley Homme, APRN - CNP    [START ON 10/11/2022] gabapentin (NEURONTIN) capsule 200 mg, 200 mg, Oral, BID AC, Lesley Homme, APRN - CNP    methylPREDNISolone (MEDROL) tablet 4 mg, 4 mg, Oral, QAM AC, Lesley Homme, APRN - CNP, 4 mg at 10/10/22 2483    methylPREDNISolone (MEDROL) tablet 4 mg, 4 mg, Oral, Nightly, Lesley Homme, APRN - CNP, 4 mg at 10/09/22 2058    polyethylene glycol (GLYCOLAX) packet 17 g, 17 g, Oral, Daily, Miriam Salter MD, 17 g at 10/10/22 6104    sennosides-docusate sodium (SENOKOT-S) 8.6-50 MG tablet 2 tablet, 2 tablet, Oral, Daily, Miriam Salter MD, 2 tablet at 10/10/22 0842    LORazepam (ATIVAN) tablet 0.5 mg, 0.5 mg, Oral, Q6H PRN, Miriam Salter MD, 0.5 mg at 10/10/22 1519    oxyCODONE (ROXICODONE) immediate release tablet 10 mg, 10 mg, Oral, Q4H PRN, Miriam Salter MD, 10 mg at 10/10/22 1409    naloxone [facility]) injection 0.4 mg, 0.4 mg, IntraVENous, PRN, Miriam Salter MD    oxyCODONE (ROXICODONE) immediate release tablet 5 mg, 5 mg, Oral, Q4H PRN, Miriam Salter MD, 5 mg at 10/08/22 0645    levETIRAcetam (KEPPRA) tablet 500 mg, 500 mg, Oral, BID, MISSY Roche - CNP, 500 mg at Patient notified.    10/10/22 0842    lactated ringers infusion, , IntraVENous, Continuous, Drusilla Mohs, MD, Last Rate: 100 mL/hr at 10/06/22 1025, New Bag at 10/06/22 1025    atorvastatin (LIPITOR) tablet 40 mg, 40 mg, Oral, Nightly, Tammy Hicks MD    acetaminophen (TYLENOL) tablet 650 mg, 650 mg, Oral, Q4H PRN, Tammy Hicks MD, 650 mg at 10/10/22 1410    ondansetron (ZOFRAN-ODT) disintegrating tablet 4 mg, 4 mg, Oral, Q8H PRN **OR** ondansetron (ZOFRAN) injection 4 mg, 4 mg, IntraVENous, Q6H PRN, Tammy Hicks MD, 4 mg at 10/05/22 3492    meclizine (ANTIVERT) tablet 12.5 mg, 12.5 mg, Oral, TID PRN, Tammy Hicks MD    ------------------------------------------    Exam:    Vitals:  /86   Pulse 61   Temp 97.9 °F (36.6 °C) (Oral)   Resp 16   Ht 6' (1.829 m)   Wt 263 lb 6.4 oz (119.5 kg)   SpO2 94%   BMI 35.72 kg/m²     Awake, alert, oriented x3. Uncomfortable with headache. Kept eyes closed. Moves all limbs spontaneously and equally.     Recent Results (from the past 24 hour(s))   Basic metabolic panel    Collection Time: 10/10/22  8:21 AM   Result Value Ref Range    Sodium 136 136 - 145 mmol/L    Potassium 4.3 3.5 - 5.1 mmol/L    Chloride 99 99 - 110 mmol/L    CO2 27 21 - 32 mmol/L    Anion Gap 10 3 - 16    Glucose 89 70 - 99 mg/dL    BUN 17 7 - 20 mg/dL    Creatinine 0.9 0.9 - 1.3 mg/dL    GFR Non-African American >60 >60    GFR African American >60 >60    Calcium 9.2 8.3 - 10.6 mg/dL   CBC with Auto Differential    Collection Time: 10/10/22  8:21 AM   Result Value Ref Range    WBC 13.5 (H) 4.0 - 11.0 K/uL    RBC 4.88 4.20 - 5.90 M/uL    Hemoglobin 15.2 13.5 - 17.5 g/dL    Hematocrit 45.1 40.5 - 52.5 %    MCV 92.3 80.0 - 100.0 fL    MCH 31.2 26.0 - 34.0 pg    MCHC 33.8 31.0 - 36.0 g/dL    RDW 13.2 12.4 - 15.4 %    Platelets 307 547 - 340 K/uL    MPV 9.1 5.0 - 10.5 fL    Neutrophils % 79.5 %    Lymphocytes % 14.8 %    Monocytes % 5.3 %    Eosinophils % 0.2 %    Basophils % 0.2 %    Neutrophils Absolute 10.7 (H) 1.7 - 7.7 K/uL    Lymphocytes Absolute 2.0 1.0 - 5.1 K/uL    Monocytes Absolute 0.7 0.0 - 1.3 K/uL    Eosinophils Absolute 0.0 0.0 - 0.6 K/uL    Basophils Absolute 0.0 0.0 - 0.2 K/uL   Comprehensive Metabolic Panel w/ Reflex to MG    Collection Time: 10/10/22  8:21 AM   Result Value Ref Range    Total Protein 7.3 6.4 - 8.2 g/dL    Albumin 4.2 3.4 - 5.0 g/dL    Albumin/Globulin Ratio 1.4 1.1 - 2.2    Total Bilirubin 0.5 0.0 - 1.0 mg/dL    Alkaline Phosphatase 115 40 - 129 U/L    ALT 37 10 - 40 U/L    AST 15 15 - 37 U/L       --    ----------------------------------------      Impression:  Nonaneurysmal subarachnoid hemorrhage. Headache secondary to [facility]. Plan:  Increase gabapentin to 200 mg twice daily and 300 mg at bedtime. Spoke with patient about avoiding narcotics if possible. May stop Keppra when prescription runs out. Patient has had no seizures. Discussed test results with the patient and his family at bedside. They requested to know what else was pending from the lumbar puncture. The only thing still pending his oligoclonal bands. I explained that we do not have to wait for those results as they are a nonspecific indicator of inflammation. Patient most concerned about pain control. I discussed the possibility of rebound headache with narcotics. Discussed increasing the gabapentin evening dose. Follow-up with neurology in 2 to 4 weeks. --  Thank you for allowing me to participate in the care of your patient. If I can be of any further assistance, please do not hesitate to contact me.     Electronically signed by Risa Orozco MD on 10/10/2022 at 4:31 PM    [facility]